# Patient Record
Sex: FEMALE | Race: WHITE | NOT HISPANIC OR LATINO | Employment: FULL TIME | URBAN - METROPOLITAN AREA
[De-identification: names, ages, dates, MRNs, and addresses within clinical notes are randomized per-mention and may not be internally consistent; named-entity substitution may affect disease eponyms.]

---

## 2023-03-23 DIAGNOSIS — R05.3 CHRONIC COUGH: Primary | ICD-10-CM

## 2023-05-08 DIAGNOSIS — I10 ESSENTIAL (PRIMARY) HYPERTENSION: ICD-10-CM

## 2023-05-08 RX ORDER — NEOMYCIN/POLYMYXIN B/PRAMOXINE 3.5-10K-1
2 CREAM (GRAM) TOPICAL DAILY
COMMUNITY

## 2023-05-08 RX ORDER — LOSARTAN POTASSIUM 100 MG/1
1 TABLET ORAL DAILY
COMMUNITY
Start: 2022-11-29 | End: 2023-06-26

## 2023-05-08 RX ORDER — CITALOPRAM 20 MG/1
1 TABLET, FILM COATED ORAL DAILY
COMMUNITY
Start: 2014-08-19 | End: 2023-07-24

## 2023-05-08 RX ORDER — SIMVASTATIN 20 MG/1
1 TABLET, FILM COATED ORAL DAILY
COMMUNITY
Start: 2019-02-19 | End: 2023-07-03

## 2023-05-08 RX ORDER — METOPROLOL SUCCINATE 100 MG/1
TABLET, EXTENDED RELEASE ORAL
Qty: 90 TABLET | Refills: 1 | Status: SHIPPED | OUTPATIENT
Start: 2023-05-08 | End: 2023-07-31 | Stop reason: SDUPTHER

## 2023-05-08 RX ORDER — METOPROLOL SUCCINATE 100 MG/1
1 TABLET, EXTENDED RELEASE ORAL DAILY
COMMUNITY
Start: 2019-10-04 | End: 2023-07-31 | Stop reason: ALTCHOICE

## 2023-05-08 RX ORDER — LEVONORGESTREL / ETHINYL ESTRADIOL AND ETHINYL ESTRADIOL 150-30(84)
1 KIT ORAL DAILY
COMMUNITY
Start: 2017-11-14 | End: 2023-10-19 | Stop reason: SDUPTHER

## 2023-05-08 RX ORDER — ALBUTEROL SULFATE 90 UG/1
2 AEROSOL, METERED RESPIRATORY (INHALATION)
COMMUNITY
Start: 2023-03-03

## 2023-05-15 LAB
BASOPHILS (10*3/UL) IN BLOOD BY AUTOMATED COUNT: 0.03 X10E9/L (ref 0–0.1)
BASOPHILS/100 LEUKOCYTES IN BLOOD BY AUTOMATED COUNT: 0.6 % (ref 0–2)
CALCIDIOL (25 OH VITAMIN D3) (NG/ML) IN SER/PLAS: 38 NG/ML
EOSINOPHILS (10*3/UL) IN BLOOD BY AUTOMATED COUNT: 0.07 X10E9/L (ref 0–0.7)
EOSINOPHILS/100 LEUKOCYTES IN BLOOD BY AUTOMATED COUNT: 1.5 % (ref 0–6)
ERYTHROCYTE DISTRIBUTION WIDTH (RATIO) BY AUTOMATED COUNT: 13.1 % (ref 11.5–14.5)
ERYTHROCYTE MEAN CORPUSCULAR HEMOGLOBIN CONCENTRATION (G/DL) BY AUTOMATED: 29.8 G/DL (ref 32–36)
ERYTHROCYTE MEAN CORPUSCULAR VOLUME (FL) BY AUTOMATED COUNT: 99 FL (ref 80–100)
ERYTHROCYTES (10*6/UL) IN BLOOD BY AUTOMATED COUNT: 4.5 X10E12/L (ref 4–5.2)
FERRITIN (UG/LL) IN SER/PLAS: 42 UG/L (ref 8–150)
HEMATOCRIT (%) IN BLOOD BY AUTOMATED COUNT: 44.6 % (ref 36–46)
HEMOGLOBIN (G/DL) IN BLOOD: 13.3 G/DL (ref 12–16)
IMMATURE GRANULOCYTES/100 LEUKOCYTES IN BLOOD BY AUTOMATED COUNT: 0 % (ref 0–0.9)
IRON (UG/DL) IN SER/PLAS: 136 UG/DL (ref 35–150)
IRON BINDING CAPACITY (UG/DL) IN SER/PLAS: 479 UG/DL (ref 240–445)
IRON SATURATION (%) IN SER/PLAS: 28 % (ref 25–45)
LEUKOCYTES (10*3/UL) IN BLOOD BY AUTOMATED COUNT: 4.7 X10E9/L (ref 4.4–11.3)
LYMPHOCYTES (10*3/UL) IN BLOOD BY AUTOMATED COUNT: 1.16 X10E9/L (ref 1.2–4.8)
LYMPHOCYTES/100 LEUKOCYTES IN BLOOD BY AUTOMATED COUNT: 24.8 % (ref 13–44)
MONOCYTES (10*3/UL) IN BLOOD BY AUTOMATED COUNT: 0.53 X10E9/L (ref 0.1–1)
MONOCYTES/100 LEUKOCYTES IN BLOOD BY AUTOMATED COUNT: 11.3 % (ref 2–10)
NEUTROPHILS (10*3/UL) IN BLOOD BY AUTOMATED COUNT: 2.88 X10E9/L (ref 1.2–7.7)
NEUTROPHILS/100 LEUKOCYTES IN BLOOD BY AUTOMATED COUNT: 61.8 % (ref 40–80)
NRBC (PER 100 WBCS) BY AUTOMATED COUNT: 0 /100 WBC (ref 0–0)
PLATELETS (10*3/UL) IN BLOOD AUTOMATED COUNT: 301 X10E9/L (ref 150–450)

## 2023-06-25 DIAGNOSIS — I10 ESSENTIAL (PRIMARY) HYPERTENSION: ICD-10-CM

## 2023-06-26 RX ORDER — LOSARTAN POTASSIUM 100 MG/1
TABLET ORAL
Qty: 90 TABLET | Refills: 0 | Status: SHIPPED | OUTPATIENT
Start: 2023-06-26 | End: 2023-07-31 | Stop reason: SDUPTHER

## 2023-07-03 DIAGNOSIS — I10 HYPERTENSION, UNSPECIFIED TYPE: ICD-10-CM

## 2023-07-03 RX ORDER — SIMVASTATIN 20 MG/1
TABLET, FILM COATED ORAL
Qty: 30 TABLET | Refills: 0 | Status: SHIPPED | OUTPATIENT
Start: 2023-07-03 | End: 2023-08-02

## 2023-07-22 DIAGNOSIS — F41.9 ANXIETY: ICD-10-CM

## 2023-07-24 PROBLEM — E78.00 HYPERCHOLESTEROLEMIA: Status: ACTIVE | Noted: 2023-07-24

## 2023-07-24 PROBLEM — J98.6 DIAPHRAGMATIC PARALYSIS: Status: ACTIVE | Noted: 2023-07-24

## 2023-07-24 PROBLEM — E04.9 GOITER, NODULAR: Status: ACTIVE | Noted: 2023-07-24

## 2023-07-24 PROBLEM — F41.9 ANXIETY: Status: ACTIVE | Noted: 2023-07-24

## 2023-07-24 PROBLEM — I10 BENIGN HYPERTENSION: Status: ACTIVE | Noted: 2023-07-24

## 2023-07-24 PROBLEM — R29.818 SUSPECTED SLEEP APNEA: Status: ACTIVE | Noted: 2023-07-24

## 2023-07-24 RX ORDER — CITALOPRAM 20 MG/1
TABLET, FILM COATED ORAL
Qty: 30 TABLET | Refills: 0 | Status: SHIPPED | OUTPATIENT
Start: 2023-07-24 | End: 2023-08-16

## 2023-07-24 NOTE — PROGRESS NOTES
Subjective   Patient ID:   Natty Lord is a 42 y.o. female who presents for Med Refill, Weight Loss, Sleep Study (Need cpap, 2 weeks ago had an appt ), and Follow-up (  Right Diaphragm paralyzed and elevated following up ).  HPI  Disorder of diaphragm:  Seeing pulmonology.    Sleep apnea:  Seeing sleep medicine.    Obesity:  Last A1c was 4.5 in 2019.  Continuing to gain weight.    HTN:  Taking Metoprolol and Losartan.  BP today is 165/107.  This is much higher than last visit.  Denies dizziness, headaches.     HLD:  Taking Simvastatin.  Last checked Nov 2022.     Depression/anxiety:  Taking Celexa.  Denies SI/HI.     Multinodular goiter:  Last checked April 2022 and was stable.  We did also find either a lymph node or benign parathyroid tumor.  She was to follow up with endocrine.     Health maintenance:  Smoking: Former smoker.  Mammogram: DUE - reports gets every October.  Labs: Nov 2022  Colon cancer family history: Sep 2021.  Influenza:     Review of Systems  12 point review of systems negative unless stated above in HPI    Vitals:    07/31/23 0850   BP: (!) 165/107   Pulse: 78   Resp: 18   SpO2: 97%       Physical Exam  General: Alert and oriented, well nourished, no acute distress.  Lungs: Clear to auscultation, non-labored respiration.  Heart: Normal rate, regular rhythm, no murmur, gallop or edema.  Neurologic: Awake, alert, and oriented X3, CN II-XII intact.  Psychiatric: Cooperative, appropriate mood and affect.    Assessment/Plan   I have ordered some labs to be done as soon as you can.  We will call you with the results.  Let's also increase your Losartan to 100mg twice daily.  Consider checking this at home.  Please call your insurance regarding weight loss medicines.  If symptoms persist or worsen despite current plan of care, please contact your healthcare provider for further evaluation.  Patient instructed to contact the office if there are any questions regarding their care or treatment.     [facility] [phone].  [facility] [phone].    Fu 1 month  Diagnoses and all orders for this visit:  Anxiety  Benign hypertension  Diaphragmatic paralysis  Goiter, nodular  Hypercholesterolemia  Suspected sleep apnea  Essential (primary) hypertension  -     losartan (Cozaar) 100 mg tablet; Take 1 tablet (100 mg) by mouth 2 times a day.  -     metoprolol succinate XL (Toprol-XL) 100 mg 24 hr tablet; Take 1 tablet (100 mg) by mouth once daily. Do not crush or chew.  BMI 50.0-59.9, adult (CMS/[facility])

## 2023-07-31 ENCOUNTER — LAB (OUTPATIENT)
Dept: LAB | Facility: LAB | Age: 43
End: 2023-07-31
Payer: COMMERCIAL

## 2023-07-31 ENCOUNTER — OFFICE VISIT (OUTPATIENT)
Dept: PRIMARY CARE | Facility: CLINIC | Age: 43
End: 2023-07-31
Payer: COMMERCIAL

## 2023-07-31 VITALS
HEART RATE: 78 BPM | OXYGEN SATURATION: 97 % | WEIGHT: 293 LBS | HEIGHT: 67 IN | RESPIRATION RATE: 18 BRPM | BODY MASS INDEX: 45.99 KG/M2 | SYSTOLIC BLOOD PRESSURE: 165 MMHG | DIASTOLIC BLOOD PRESSURE: 107 MMHG

## 2023-07-31 DIAGNOSIS — E78.00 HYPERCHOLESTEROLEMIA: ICD-10-CM

## 2023-07-31 DIAGNOSIS — I10 BENIGN HYPERTENSION: ICD-10-CM

## 2023-07-31 DIAGNOSIS — R73.9 HYPERGLYCEMIA: ICD-10-CM

## 2023-07-31 DIAGNOSIS — J98.6 DIAPHRAGMATIC PARALYSIS: ICD-10-CM

## 2023-07-31 DIAGNOSIS — E04.9 GOITER, NODULAR: ICD-10-CM

## 2023-07-31 DIAGNOSIS — I10 ESSENTIAL (PRIMARY) HYPERTENSION: ICD-10-CM

## 2023-07-31 DIAGNOSIS — R29.818 SUSPECTED SLEEP APNEA: ICD-10-CM

## 2023-07-31 DIAGNOSIS — F41.9 ANXIETY: Primary | ICD-10-CM

## 2023-07-31 PROBLEM — K52.9 ACUTE GASTROENTERITIS: Status: RESOLVED | Noted: 2023-07-31 | Resolved: 2023-07-31

## 2023-07-31 PROBLEM — R05.9 COUGH: Status: RESOLVED | Noted: 2023-07-31 | Resolved: 2023-07-31

## 2023-07-31 PROBLEM — J20.8 ACUTE VIRAL BRONCHITIS: Status: RESOLVED | Noted: 2023-07-31 | Resolved: 2023-07-31

## 2023-07-31 PROBLEM — E66.01 MORBID OBESITY (MULTI): Status: ACTIVE | Noted: 2023-07-31

## 2023-07-31 PROBLEM — E55.9 VITAMIN D DEFICIENCY: Status: ACTIVE | Noted: 2023-07-31

## 2023-07-31 PROBLEM — J20.9 ACUTE BRONCHITIS: Status: RESOLVED | Noted: 2023-07-31 | Resolved: 2023-07-31

## 2023-07-31 PROBLEM — D72.9 ABNORMAL WBC COUNT: Status: ACTIVE | Noted: 2023-07-31

## 2023-07-31 PROBLEM — M79.673 FOOT PAIN: Status: ACTIVE | Noted: 2023-07-31

## 2023-07-31 PROBLEM — F32.A DEPRESSION: Status: ACTIVE | Noted: 2023-07-31

## 2023-07-31 PROBLEM — M06.9 RHEUMATOID ARTHRITIS (MULTI): Status: ACTIVE | Noted: 2023-07-31

## 2023-07-31 PROBLEM — R05.3 CHRONIC COUGH: Status: RESOLVED | Noted: 2023-07-31 | Resolved: 2023-07-31

## 2023-07-31 PROBLEM — G47.33 OBSTRUCTIVE SLEEP APNEA: Status: ACTIVE | Noted: 2023-07-31

## 2023-07-31 PROBLEM — E88.819 INSULIN RESISTANCE: Status: ACTIVE | Noted: 2023-07-31

## 2023-07-31 PROBLEM — E04.1 THYROID NODULE: Status: ACTIVE | Noted: 2023-07-31

## 2023-07-31 PROBLEM — K63.5 POLYP OF COLON: Status: ACTIVE | Noted: 2023-07-31

## 2023-07-31 PROBLEM — D72.819 LEUKOPENIA: Status: ACTIVE | Noted: 2023-07-31

## 2023-07-31 PROBLEM — F45.41 STRESS HEADACHE: Status: ACTIVE | Noted: 2023-07-31

## 2023-07-31 PROBLEM — E86.0 MILD DEHYDRATION: Status: RESOLVED | Noted: 2023-07-31 | Resolved: 2023-07-31

## 2023-07-31 PROBLEM — J98.4 SCARRING OF LUNG: Status: ACTIVE | Noted: 2023-07-31

## 2023-07-31 PROBLEM — M77.11 LATERAL EPICONDYLITIS OF RIGHT ELBOW: Status: ACTIVE | Noted: 2023-07-31

## 2023-07-31 PROBLEM — J10.1 INFLUENZA DUE TO INFLUENZA A VIRUS: Status: RESOLVED | Noted: 2023-07-31 | Resolved: 2023-07-31

## 2023-07-31 PROBLEM — G47.19 EXCESSIVE DAYTIME SLEEPINESS: Status: ACTIVE | Noted: 2023-07-31

## 2023-07-31 PROBLEM — J03.90: Status: RESOLVED | Noted: 2023-07-31 | Resolved: 2023-07-31

## 2023-07-31 PROBLEM — Z86.39 H/O HYPERCHOLESTEROLEMIA: Status: ACTIVE | Noted: 2023-07-31

## 2023-07-31 PROBLEM — R06.09 DOE (DYSPNEA ON EXERTION): Status: ACTIVE | Noted: 2023-07-31

## 2023-07-31 PROBLEM — E83.10 DISORDER OF IRON METABOLISM: Status: ACTIVE | Noted: 2023-07-31

## 2023-07-31 PROBLEM — G47.00 INSOMNIA: Status: ACTIVE | Noted: 2023-07-31

## 2023-07-31 PROBLEM — J02.9 ACUTE PHARYNGITIS: Status: RESOLVED | Noted: 2023-07-31 | Resolved: 2023-07-31

## 2023-07-31 LAB — THYROTROPIN (MIU/L) IN SER/PLAS BY DETECTION LIMIT <= 0.05 MIU/L: 2.14 MIU/L (ref 0.44–3.98)

## 2023-07-31 PROCEDURE — 3077F SYST BP >= 140 MM HG: CPT | Performed by: PHYSICIAN ASSISTANT

## 2023-07-31 PROCEDURE — 99214 OFFICE O/P EST MOD 30 MIN: CPT | Performed by: PHYSICIAN ASSISTANT

## 2023-07-31 PROCEDURE — 36415 COLL VENOUS BLD VENIPUNCTURE: CPT

## 2023-07-31 PROCEDURE — 83036 HEMOGLOBIN GLYCOSYLATED A1C: CPT

## 2023-07-31 PROCEDURE — 1036F TOBACCO NON-USER: CPT | Performed by: PHYSICIAN ASSISTANT

## 2023-07-31 PROCEDURE — 3080F DIAST BP >= 90 MM HG: CPT | Performed by: PHYSICIAN ASSISTANT

## 2023-07-31 PROCEDURE — 3008F BODY MASS INDEX DOCD: CPT | Performed by: PHYSICIAN ASSISTANT

## 2023-07-31 PROCEDURE — 84443 ASSAY THYROID STIM HORMONE: CPT

## 2023-07-31 RX ORDER — METOPROLOL SUCCINATE 100 MG/1
100 TABLET, EXTENDED RELEASE ORAL DAILY
Qty: 90 TABLET | Refills: 1 | Status: SHIPPED | OUTPATIENT
Start: 2023-07-31 | End: 2024-01-29

## 2023-07-31 RX ORDER — LOSARTAN POTASSIUM 100 MG/1
100 TABLET ORAL 2 TIMES DAILY
Qty: 180 TABLET | Refills: 1 | Status: SHIPPED | OUTPATIENT
Start: 2023-07-31 | End: 2024-01-25

## 2023-07-31 RX ORDER — LISINOPRIL 20 MG/1
1 TABLET ORAL DAILY
COMMUNITY
Start: 2022-10-05 | End: 2023-07-31 | Stop reason: ALTCHOICE

## 2023-07-31 ASSESSMENT — PAIN SCALES - GENERAL: PAINLEVEL: 0-NO PAIN

## 2023-08-01 LAB
ESTIMATED AVERAGE GLUCOSE FOR HBA1C: 105 MG/DL
HEMOGLOBIN A1C/HEMOGLOBIN TOTAL IN BLOOD: 5.3 %

## 2023-08-02 DIAGNOSIS — I10 HYPERTENSION, UNSPECIFIED TYPE: ICD-10-CM

## 2023-08-02 RX ORDER — SIMVASTATIN 20 MG/1
20 TABLET, FILM COATED ORAL DAILY
Qty: 90 TABLET | Refills: 0 | Status: SHIPPED | OUTPATIENT
Start: 2023-08-02 | End: 2023-10-30

## 2023-08-04 RX ORDER — TIRZEPATIDE 2.5 MG/.5ML
2.5 INJECTION, SOLUTION SUBCUTANEOUS
Qty: 2 ML | Refills: 1 | Status: SHIPPED | OUTPATIENT
Start: 2023-08-04 | End: 2023-08-31

## 2023-08-16 DIAGNOSIS — F41.9 ANXIETY: ICD-10-CM

## 2023-08-16 RX ORDER — CITALOPRAM 20 MG/1
TABLET, FILM COATED ORAL
Qty: 30 TABLET | Refills: 0 | Status: SHIPPED | OUTPATIENT
Start: 2023-08-16 | End: 2023-09-12

## 2023-08-22 ENCOUNTER — PATIENT MESSAGE (OUTPATIENT)
Dept: PRIMARY CARE | Facility: CLINIC | Age: 43
End: 2023-08-22
Payer: COMMERCIAL

## 2023-08-23 NOTE — PROGRESS NOTES
Subjective   Patient ID:   Natty Lord is a 42 y.o. female who presents for Follow-up (F/u bp).  HPI  Disorder of diaphragm:  Seeing pulmonology.    Sleep apnea:  Seeing sleep medicine.    Obesity:  Last A1c was 5.3 in July 2023.  Continuing to gain weight.  I advised to call insurance about weight loss medications last visit.    HTN:  I increased Losartan last visit.  Taking Metoprolol and Losartan.  BP today is 136/85.  Denies dizziness, headaches.     HLD:  Taking Simvastatin.  Last checked Nov 2022.     Depression/anxiety:  Taking Celexa.  Denies SI/HI.     Multinodular goiter:  Last checked April 2022 and was stable.  We did also find either a lymph node or benign parathyroid tumor.  She was to follow up with endocrine.  TSH was normal in July 2023.     Health maintenance:  Smoking: Former smoker.  Mammogram: DUE - reports gets every October.  Labs: Nov 2022  Colon cancer family history: Sep 2021.  Influenza:     Review of Systems  12 point review of systems negative unless stated above in HPI    Vitals:    08/31/23 0757   BP: 136/85   Pulse: 76   Resp: 18   SpO2: 95%     Physical Exam  General: Alert and oriented, well nourished, no acute distress.  Lungs: Clear to auscultation, non-labored respiration.  Heart: Normal rate, regular rhythm, no murmur, gallop or edema.  Neurologic: Awake, alert, and oriented X3, CN II-XII intact.  Psychiatric: Cooperative, appropriate mood and affect.    Assessment/Plan   I am glad you are well!  BP is much better today.  I have ordered you a mammogram to be done as soon as you are able.  We will call the results.  Continue the same medications.  Chronic conditions are stable.  Call with questions or concerns.    Follow up  3-4 months  Diagnoses and all orders for this visit:  Anxiety  Benign hypertension  Diaphragmatic paralysis  Goiter, nodular  Hypercholesterolemia  Essential (primary) hypertension  BMI 50.0-59.9, adult (CMS/[facility])  Visit for screening mammogram  -     BI  mammo bilateral screening tomosynthesis; Future

## 2023-08-31 ENCOUNTER — OFFICE VISIT (OUTPATIENT)
Dept: PRIMARY CARE | Facility: CLINIC | Age: 43
End: 2023-08-31
Payer: COMMERCIAL

## 2023-08-31 VITALS
BODY MASS INDEX: 45.99 KG/M2 | SYSTOLIC BLOOD PRESSURE: 136 MMHG | DIASTOLIC BLOOD PRESSURE: 85 MMHG | OXYGEN SATURATION: 95 % | HEART RATE: 76 BPM | HEIGHT: 67 IN | RESPIRATION RATE: 18 BRPM | WEIGHT: 293 LBS

## 2023-08-31 DIAGNOSIS — I10 BENIGN HYPERTENSION: ICD-10-CM

## 2023-08-31 DIAGNOSIS — Z12.31 VISIT FOR SCREENING MAMMOGRAM: ICD-10-CM

## 2023-08-31 DIAGNOSIS — F41.9 ANXIETY: Primary | ICD-10-CM

## 2023-08-31 DIAGNOSIS — E04.9 GOITER, NODULAR: ICD-10-CM

## 2023-08-31 DIAGNOSIS — I10 ESSENTIAL (PRIMARY) HYPERTENSION: ICD-10-CM

## 2023-08-31 DIAGNOSIS — J98.6 DIAPHRAGMATIC PARALYSIS: ICD-10-CM

## 2023-08-31 DIAGNOSIS — E78.00 HYPERCHOLESTEROLEMIA: ICD-10-CM

## 2023-08-31 PROBLEM — R87.810 CERVICAL HIGH RISK HPV (HUMAN PAPILLOMAVIRUS) TEST POSITIVE: Status: ACTIVE | Noted: 2023-08-31

## 2023-08-31 PROCEDURE — 3008F BODY MASS INDEX DOCD: CPT | Performed by: PHYSICIAN ASSISTANT

## 2023-08-31 PROCEDURE — 99213 OFFICE O/P EST LOW 20 MIN: CPT | Performed by: PHYSICIAN ASSISTANT

## 2023-08-31 PROCEDURE — 1036F TOBACCO NON-USER: CPT | Performed by: PHYSICIAN ASSISTANT

## 2023-08-31 PROCEDURE — 3075F SYST BP GE 130 - 139MM HG: CPT | Performed by: PHYSICIAN ASSISTANT

## 2023-08-31 PROCEDURE — 3079F DIAST BP 80-89 MM HG: CPT | Performed by: PHYSICIAN ASSISTANT

## 2023-08-31 ASSESSMENT — PAIN SCALES - GENERAL: PAINLEVEL: 0-NO PAIN

## 2023-09-12 DIAGNOSIS — F41.9 ANXIETY: ICD-10-CM

## 2023-09-12 RX ORDER — CITALOPRAM 20 MG/1
TABLET, FILM COATED ORAL
Qty: 30 TABLET | Refills: 0 | Status: SHIPPED | OUTPATIENT
Start: 2023-09-12 | End: 2023-10-16

## 2023-10-16 DIAGNOSIS — F41.9 ANXIETY: ICD-10-CM

## 2023-10-16 RX ORDER — CITALOPRAM 20 MG/1
TABLET, FILM COATED ORAL
Qty: 90 TABLET | Refills: 0 | Status: SHIPPED | OUTPATIENT
Start: 2023-10-16 | End: 2024-01-17

## 2023-10-17 ENCOUNTER — TELEPHONE (OUTPATIENT)
Dept: OBSTETRICS AND GYNECOLOGY | Facility: CLINIC | Age: 43
End: 2023-10-17
Payer: COMMERCIAL

## 2023-10-17 NOTE — TELEPHONE ENCOUNTER
Patient scheduled annual in November, requesting refill for her camrese 0.5mg-30 mcg to be sent to her local pharmacy on file

## 2023-10-19 DIAGNOSIS — Z30.41 SURVEILLANCE FOR BIRTH CONTROL, ORAL CONTRACEPTIVES: Primary | ICD-10-CM

## 2023-10-19 RX ORDER — LEVONORGESTREL / ETHINYL ESTRADIOL AND ETHINYL ESTRADIOL 150-30(84)
1 KIT ORAL DAILY
Qty: 91 TABLET | Refills: 0 | Status: SHIPPED | OUTPATIENT
Start: 2023-10-19 | End: 2023-11-27 | Stop reason: SDUPTHER

## 2023-10-25 ENCOUNTER — ANCILLARY PROCEDURE (OUTPATIENT)
Dept: RADIOLOGY | Facility: CLINIC | Age: 43
End: 2023-10-25
Payer: COMMERCIAL

## 2023-10-25 VITALS — WEIGHT: 293 LBS | BODY MASS INDEX: 44.41 KG/M2 | HEIGHT: 68 IN

## 2023-10-25 DIAGNOSIS — Z12.31 VISIT FOR SCREENING MAMMOGRAM: ICD-10-CM

## 2023-10-25 PROCEDURE — 77067 SCR MAMMO BI INCL CAD: CPT | Mod: 50

## 2023-10-30 DIAGNOSIS — I10 HYPERTENSION, UNSPECIFIED TYPE: ICD-10-CM

## 2023-10-30 RX ORDER — SIMVASTATIN 20 MG/1
20 TABLET, FILM COATED ORAL DAILY
Qty: 90 TABLET | Refills: 0 | Status: SHIPPED | OUTPATIENT
Start: 2023-10-30 | End: 2024-01-25

## 2023-10-30 NOTE — PROGRESS NOTES
[facility]  Followup Visit Note    HISTORY OF PRESENT ILLNESS   Natty Lord is a 43 y.o. female who presents to a [facility] for followup.     The patient  has a past medical history of Acute bronchitis (07/31/2023), Acute gastroenteritis (07/31/2023), Acute pharyngitis (07/31/2023), Acute suppurative tonsillitis (07/31/2023), Acute viral bronchitis (07/31/2023), Cough (07/31/2023), Mild dehydration (07/31/2023), Other specified anxiety disorders, Personal history of diseases of the blood and blood-forming organs and certain disorders involving the immune mechanism, Personal history of other specified conditions (12/12/2013), and Personal history of other specified conditions..      Current History    On today's visit, the patient reports she feels better with CPAP. She kept waking up with FF masks. She had leaking with that. Now using just the nasal mask and doing better.    Headaches are are much better on CPAP. She is waking up less with CPAP. She hasn't noticed a large difference in wakefulness.    RLS symptoms remain minimally bothersome.     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 (see scanned document in EPIC)  % days >4 hours use: 46  Average use : 5 hr 25 min  95% pressure 9.5 cm H2O  95% leak : 23.1 L/min  AHI: 1.8    Sleep Scales:  ESS: 8     REVIEW OF SYSTEMS    All other systems negative      ALLERGIES AND  "MEDICATIONS     ALLERGIES  Allergies   Allergen Reactions    Azithromycin Hives    Bupropion Unknown    Bupropion Hcl Unknown       MEDICATIONS: She has a current medication list which includes the following prescription(s): albuterol - Inhale 2 puffs, camrese - Take 1 tablet by mouth once daily, citalopram - TAKE ONE TABLET BY MOUTH ONCE DAILY ** NEEDS TO CONTACT OFFICE FOR APPT FOR FURTHER REFILLS, losartan - Take 1 tablet (100 mg) by mouth 2 times a day, metoprolol succinate xl - Take 1 tablet (100 mg) by mouth once daily. Do not crush or chew, mv-min-folic acid-lutein - Chew 2 tablets once daily, and simvastatin - TAKE 1 TABLET BY MOUTH EVERY DAY.    PAST MEDICAL HISTORY : She  has a past medical history of Acute bronchitis (2023), Acute gastroenteritis (2023), Acute pharyngitis (2023), Acute suppurative tonsillitis (2023), Acute viral bronchitis (2023), Cough (2023), Mild dehydration (2023), Other specified anxiety disorders, Personal history of diseases of the blood and blood-forming organs and certain disorders involving the immune mechanism, Personal history of other specified conditions (2013), and Personal history of other specified conditions.    PAST SURGICAL HISTORY: She  has a past surgical history that includes  section, classic (2012).     FAMILY HISTORY: No changes since previous visit. Otherwise non-contributory as charted.       SOCIAL HISTORY  She  reports that she quit smoking about 15 years ago. Her smoking use included cigarettes. She started smoking about 24 years ago. She smoked an average of .25 packs per day. She has never used smokeless tobacco. She reports that she does not currently use drugs. She reports that she does not drink alcohol.       PHYSICAL EXAM     VITAL SIGNS: /86   Pulse 73   Ht 1.727 m (5' 8\")   Wt 145 kg (320 lb)   LMP 2023 Comment: birth contol cycle every 3 months  SpO2 94%   BMI 48.66 " kg/m²      CURRENT WEIGHT: [unfilled]  BMI: [unfilled]  PREVIOUS WEIGHTS:  Wt Readings from Last 3 Encounters:   11/01/23 145 kg (320 lb)   10/25/23 147 kg (325 lb)   08/31/23 145 kg (320 lb 9.6 oz)       Constitutional: Alert and oriented, cooperative, no obvious distress   HEENT: Non icteric or anemic, EOM WNL bilaterally   Neck: Supple, no JVD, no goiter, no adenopathy, no rigidity  Extremities: No clubbing, no LL edema   Neuromuscular: Cranial nerves grossly intact, no focal deficits     RESULTS/DATA     Iron (ug/dL)   Date Value   05/15/2023 136     Iron Saturation (%)   Date Value   05/15/2023 28     TIBC (ug/dL)   Date Value   05/15/2023 479 (H)     Ferritin (ug/L)   Date Value   05/15/2023 42         ASSESSMENT/PLAN     Ms. Lord is a 43 y.o. female and returns in followup for the following issues:    OBSTRUCTIVE SLEEP APNEA / HEADACHES  -Benefiting from CPAP - fewer headaches and less waking at night  -Very close to meeting 70% compliance requirement  -Goal of CPAP therapy is less sleepiness, less waking, fewer headaches, better BP    OBESITY  -BMI 49 TODAY    HYPERTENSION  -/86 today    RESTLESS LEG SYNDROME  -Symptoms remain minimally bothersome    Follow up in 3 weeks for compliance requirement

## 2023-11-01 ENCOUNTER — OFFICE VISIT (OUTPATIENT)
Dept: SLEEP MEDICINE | Facility: CLINIC | Age: 43
End: 2023-11-01
Payer: COMMERCIAL

## 2023-11-01 VITALS
BODY MASS INDEX: 44.41 KG/M2 | OXYGEN SATURATION: 94 % | DIASTOLIC BLOOD PRESSURE: 86 MMHG | HEIGHT: 68 IN | HEART RATE: 73 BPM | SYSTOLIC BLOOD PRESSURE: 139 MMHG | WEIGHT: 293 LBS

## 2023-11-01 DIAGNOSIS — I10 ESSENTIAL (PRIMARY) HYPERTENSION: ICD-10-CM

## 2023-11-01 DIAGNOSIS — G47.33 OSA (OBSTRUCTIVE SLEEP APNEA): Primary | ICD-10-CM

## 2023-11-01 DIAGNOSIS — G44.229 CHRONIC TENSION-TYPE HEADACHE, NOT INTRACTABLE: ICD-10-CM

## 2023-11-01 DIAGNOSIS — G25.81 RESTLESS LEG SYNDROME: ICD-10-CM

## 2023-11-01 PROCEDURE — 3079F DIAST BP 80-89 MM HG: CPT | Performed by: PHYSICIAN ASSISTANT

## 2023-11-01 PROCEDURE — 99214 OFFICE O/P EST MOD 30 MIN: CPT | Performed by: PHYSICIAN ASSISTANT

## 2023-11-01 PROCEDURE — 3008F BODY MASS INDEX DOCD: CPT | Performed by: PHYSICIAN ASSISTANT

## 2023-11-01 PROCEDURE — 3075F SYST BP GE 130 - 139MM HG: CPT | Performed by: PHYSICIAN ASSISTANT

## 2023-11-01 PROCEDURE — 1036F TOBACCO NON-USER: CPT | Performed by: PHYSICIAN ASSISTANT

## 2023-11-01 ASSESSMENT — SLEEP AND FATIGUE QUESTIONNAIRES
HOW LIKELY ARE YOU TO NOD OFF OR FALL ASLEEP WHILE LYING DOWN TO REST IN THE AFTERNOON WHEN CIRCUMSTANCES PERMIT: WOULD NEVER DOZE
SITING INACTIVE IN A PUBLIC PLACE LIKE A CLASS ROOM OR A MOVIE THEATER: MODERATE CHANCE OF DOZING
HOW LIKELY ARE YOU TO NOD OFF OR FALL ASLEEP WHILE SITTING QUIETLY AFTER LUNCH WITHOUT ALCOHOL: WOULD NEVER DOZE
HOW LIKELY ARE YOU TO NOD OFF OR FALL ASLEEP IN A CAR, WHILE STOPPED FOR A FEW MINUTES IN TRAFFIC: WOULD NEVER DOZE
ESS-CHAD TOTAL SCORE: 8
HOW LIKELY ARE YOU TO NOD OFF OR FALL ASLEEP WHILE SITTING AND READING: SLIGHT CHANCE OF DOZING
HOW LIKELY ARE YOU TO NOD OFF OR FALL ASLEEP WHILE WATCHING TV: MODERATE CHANCE OF DOZING
HOW LIKELY ARE YOU TO NOD OFF OR FALL ASLEEP WHILE SITTING AND TALKING TO SOMEONE: WOULD NEVER DOZE
HOW LIKELY ARE YOU TO NOD OFF OR FALL ASLEEP WHEN YOU ARE A PASSENGER IN A CAR FOR AN HOUR WITHOUT A BREAK: HIGH CHANCE OF DOZING

## 2023-11-01 ASSESSMENT — PAIN SCALES - GENERAL: PAINLEVEL: 0-NO PAIN

## 2023-11-01 ASSESSMENT — LIFESTYLE VARIABLES: HOW MANY STANDARD DRINKS CONTAINING ALCOHOL DO YOU HAVE ON A TYPICAL DAY: PATIENT DOES NOT DRINK

## 2023-11-01 ASSESSMENT — PATIENT HEALTH QUESTIONNAIRE - PHQ9
2. FEELING DOWN, DEPRESSED OR HOPELESS: NOT AT ALL
SUM OF ALL RESPONSES TO PHQ9 QUESTIONS 1 & 2: 0
1. LITTLE INTEREST OR PLEASURE IN DOING THINGS: NOT AT ALL

## 2023-11-01 NOTE — PATIENT INSTRUCTIONS
"  Thank you for coming to the Sleep Medicine Clinic today! Your sleep medicine provider today was: Domenico Ospina PA-C Below is a summary of your treatment plan, other important information, and our contact numbers:      TREATMENT PLAN     Keep using CPAP as much as possible!    Follow-up Appointment:   3 weeks      IMPORTANT PHONE NUMBERS     Sleep Medicine Clinic Fax: [phone]  Appointments (for Adult Sleep Clinic): [phone] - option 2  Appointments (For Sleep Studies): [phone] - option 3  Behavioral Sleep Medicine: [phone]    [facility] ([employer]): [phone]  For clinical questions and refilling prescriptions: [phone]  Jennifer Crowley (For Pranav/Anai): P: [phone]  F: [phone]       CONTACTING YOUR SLEEP MEDICINE PROVIDER     Send a message directly to your provider through \"My Chart\", which is the email service through your  Records Account: [email or web address]   Call [phone] and leave a message. One of the administrative assistants will forward the message to your sleep medicine provider through \"My Chart\" and/or email.     Your sleep medicine provider for this visit was: Domenico Ospina PA-C       "

## 2023-11-08 ENCOUNTER — PATIENT MESSAGE (OUTPATIENT)
Dept: PULMONOLOGY | Facility: CLINIC | Age: 43
End: 2023-11-08
Payer: COMMERCIAL

## 2023-11-08 DIAGNOSIS — R09.82 POST-NASAL DRAINAGE: Primary | ICD-10-CM

## 2023-11-09 PROBLEM — R09.82 POST-NASAL DRAINAGE: Status: ACTIVE | Noted: 2023-11-09

## 2023-11-26 ASSESSMENT — ENCOUNTER SYMPTOMS
DYSURIA: 0
CHILLS: 0
NAUSEA: 0
COUGH: 0
DIZZINESS: 0
VOMITING: 0
UNEXPECTED WEIGHT CHANGE: 0
SHORTNESS OF BREATH: 0
FATIGUE: 0
FEVER: 0
HEADACHES: 0
ABDOMINAL PAIN: 0
COLOR CHANGE: 0

## 2023-11-27 ENCOUNTER — OFFICE VISIT (OUTPATIENT)
Dept: OBSTETRICS AND GYNECOLOGY | Facility: CLINIC | Age: 43
End: 2023-11-27
Payer: COMMERCIAL

## 2023-11-27 VITALS — SYSTOLIC BLOOD PRESSURE: 158 MMHG | WEIGHT: 293 LBS | BODY MASS INDEX: 48.93 KG/M2 | DIASTOLIC BLOOD PRESSURE: 88 MMHG

## 2023-11-27 DIAGNOSIS — Z30.41 SURVEILLANCE FOR BIRTH CONTROL, ORAL CONTRACEPTIVES: ICD-10-CM

## 2023-11-27 DIAGNOSIS — Z01.419 ENCOUNTER FOR ANNUAL ROUTINE GYNECOLOGICAL EXAMINATION: Primary | ICD-10-CM

## 2023-11-27 DIAGNOSIS — Z12.31 ENCOUNTER FOR SCREENING MAMMOGRAM FOR MALIGNANT NEOPLASM OF BREAST: ICD-10-CM

## 2023-11-27 DIAGNOSIS — N89.8 VAGINAL IRRITATION: ICD-10-CM

## 2023-11-27 PROCEDURE — 99396 PREV VISIT EST AGE 40-64: CPT

## 2023-11-27 PROCEDURE — 3008F BODY MASS INDEX DOCD: CPT

## 2023-11-27 PROCEDURE — 1036F TOBACCO NON-USER: CPT

## 2023-11-27 PROCEDURE — 3079F DIAST BP 80-89 MM HG: CPT

## 2023-11-27 PROCEDURE — 3077F SYST BP >= 140 MM HG: CPT

## 2023-11-27 RX ORDER — FLUCONAZOLE 150 MG/1
150 TABLET ORAL ONCE
Qty: 1 TABLET | Refills: 0 | Status: SHIPPED | OUTPATIENT
Start: 2023-11-27 | End: 2023-11-27

## 2023-11-27 RX ORDER — LEVONORGESTREL / ETHINYL ESTRADIOL AND ETHINYL ESTRADIOL 150-30(84)
1 KIT ORAL DAILY
Qty: 91 TABLET | Refills: 3 | Status: SHIPPED | OUTPATIENT
Start: 2023-11-27 | End: 2024-11-26

## 2023-11-27 ASSESSMENT — LIFESTYLE VARIABLES
HOW OFTEN DO YOU HAVE SIX OR MORE DRINKS ON ONE OCCASION: NEVER
HOW MANY STANDARD DRINKS CONTAINING ALCOHOL DO YOU HAVE ON A TYPICAL DAY: PATIENT DOES NOT DRINK
HOW OFTEN DO YOU HAVE A DRINK CONTAINING ALCOHOL: NEVER
AUDIT-C TOTAL SCORE: 0
SKIP TO QUESTIONS 9-10: 1

## 2023-11-27 ASSESSMENT — PATIENT HEALTH QUESTIONNAIRE - PHQ9
SUM OF ALL RESPONSES TO PHQ9 QUESTIONS 1 & 2: 0
2. FEELING DOWN, DEPRESSED OR HOPELESS: NOT AT ALL
1. LITTLE INTEREST OR PLEASURE IN DOING THINGS: NOT AT ALL

## 2023-11-27 ASSESSMENT — ENCOUNTER SYMPTOMS
CONSTITUTIONAL NEGATIVE: 0
EYES NEGATIVE: 0
CARDIOVASCULAR NEGATIVE: 0
MUSCULOSKELETAL NEGATIVE: 0
ENDOCRINE NEGATIVE: 0
RESPIRATORY NEGATIVE: 0
PSYCHIATRIC NEGATIVE: 0
HEMATOLOGIC/LYMPHATIC NEGATIVE: 0
NEUROLOGICAL NEGATIVE: 0
ALLERGIC/IMMUNOLOGIC NEGATIVE: 0
GASTROINTESTINAL NEGATIVE: 0

## 2023-11-27 NOTE — PROGRESS NOTES
"Subjective   Natty Lord is a 43 y.o. female who is here for a routine GYN exam. Last saw Dr. Golden 10/2022, previous Dr. Pike patient. Overall doing well. Tolerating OCP well - hx of HTN overall controlled with meds, but denies migraines with auras, smoking, liver dz, vasc dz or blood clots. Denies breast changes or concerns. Does have some vaginal irritation / \"yeast\" symptoms right now after finishing her withdrawal bleed. No abnormal discharge, mainly itching and burning.     Complaints: see HPI  Periods: regular, 91 day OCP  Dysmenorrhea: none    Current contraception: OCP  History of abnormal Pap smear: yes  History of abnormal mammogram: no      OB History          1    Para   1    Term   1            AB        Living             SAB        IAB        Ectopic        Multiple        Live Births                      Review of Systems   Constitutional:  Negative for chills, fatigue, fever and unexpected weight change.   Respiratory:  Negative for cough and shortness of breath.    Gastrointestinal:  Negative for abdominal pain, nausea and vomiting.   Genitourinary:  Negative for dyspareunia, dysuria, pelvic pain and vaginal discharge.        + vaginal irritation   Skin:  Negative for color change and rash.   Neurological:  Negative for dizziness and headaches.       Objective   /88   Wt 146 kg (321 lb 12.8 oz)   LMP 2023   BMI 48.93 kg/m²        General:   Alert and oriented, in no acute distress   Neck: Supple. No visible thyromegaly.    Breast/Axilla: Normal to palpation bilaterally without masses, skin changes, or nipple discharge.    Abdomen: Soft, non-tender, without masses or organomegaly   Vulva: Normal architecture without erythema, masses, or lesions.    Vagina: Normal mucosa without lesions, masses, or atrophy. Some erythema at introitus, small amount white discharge   Cervix: Normal without masses, lesions, or signs of cervicitis   Uterus: Grossly normal but " limited due to body habitus, non-tender   Adnexa: Grossly normal but limited due to body habitus, non-tender   Pelvic Floor Normal    Psych Normal affect. Normal mood.      Assessment/Plan   -UTD on pap smear, next due 11/2025.  -UTD on mammogram, 10/25/23 negative.  -Continue OCPs as tolerated, refills sent. Encouraged patient to check BP at home regularly and discuss with PCP if not well controlled with current meds; emphasized importance of stable BP while on EE containing OCP; patient aware.   -Diflucan x1 PO sent to patient's pharmacy for current yeast symptoms. She is aware to call the office if not improving with treatment.     Mitali Benitez PA-C

## 2023-11-29 ENCOUNTER — APPOINTMENT (OUTPATIENT)
Dept: SLEEP MEDICINE | Facility: CLINIC | Age: 43
End: 2023-11-29
Payer: COMMERCIAL

## 2024-01-17 DIAGNOSIS — F41.9 ANXIETY: ICD-10-CM

## 2024-01-17 RX ORDER — CITALOPRAM 20 MG/1
TABLET, FILM COATED ORAL
Qty: 90 TABLET | Refills: 0 | Status: SHIPPED | OUTPATIENT
Start: 2024-01-17 | End: 2024-04-16

## 2024-01-25 DIAGNOSIS — I10 ESSENTIAL (PRIMARY) HYPERTENSION: ICD-10-CM

## 2024-01-25 DIAGNOSIS — I10 HYPERTENSION, UNSPECIFIED TYPE: ICD-10-CM

## 2024-01-25 RX ORDER — LOSARTAN POTASSIUM 100 MG/1
100 TABLET ORAL DAILY
Qty: 60 TABLET | Refills: 0 | Status: SHIPPED | OUTPATIENT
Start: 2024-01-25 | End: 2024-02-13 | Stop reason: WASHOUT

## 2024-01-25 RX ORDER — SIMVASTATIN 20 MG/1
20 TABLET, FILM COATED ORAL DAILY
Qty: 30 TABLET | Refills: 0 | Status: SHIPPED | OUTPATIENT
Start: 2024-01-25 | End: 2024-02-19

## 2024-01-28 DIAGNOSIS — I10 ESSENTIAL (PRIMARY) HYPERTENSION: ICD-10-CM

## 2024-01-29 RX ORDER — METOPROLOL SUCCINATE 100 MG/1
100 TABLET, EXTENDED RELEASE ORAL DAILY
Qty: 90 TABLET | Refills: 1 | Status: SHIPPED | OUTPATIENT
Start: 2024-01-29

## 2024-02-12 ENCOUNTER — APPOINTMENT (OUTPATIENT)
Dept: PRIMARY CARE | Facility: CLINIC | Age: 44
End: 2024-02-12
Payer: COMMERCIAL

## 2024-02-13 ENCOUNTER — OFFICE VISIT (OUTPATIENT)
Dept: PRIMARY CARE | Facility: CLINIC | Age: 44
End: 2024-02-13
Payer: COMMERCIAL

## 2024-02-13 VITALS
WEIGHT: 293 LBS | HEART RATE: 72 BPM | OXYGEN SATURATION: 98 % | DIASTOLIC BLOOD PRESSURE: 80 MMHG | BODY MASS INDEX: 44.41 KG/M2 | HEIGHT: 68 IN | SYSTOLIC BLOOD PRESSURE: 138 MMHG | RESPIRATION RATE: 18 BRPM

## 2024-02-13 DIAGNOSIS — E66.01 MORBID OBESITY (MULTI): Primary | ICD-10-CM

## 2024-02-13 DIAGNOSIS — F32.A DEPRESSION, UNSPECIFIED DEPRESSION TYPE: ICD-10-CM

## 2024-02-13 DIAGNOSIS — F41.9 ANXIETY: ICD-10-CM

## 2024-02-13 DIAGNOSIS — Z00.00 HEALTHCARE MAINTENANCE: ICD-10-CM

## 2024-02-13 DIAGNOSIS — E78.00 HYPERCHOLESTEROLEMIA: ICD-10-CM

## 2024-02-13 DIAGNOSIS — G47.33 OBSTRUCTIVE SLEEP APNEA: ICD-10-CM

## 2024-02-13 DIAGNOSIS — I10 ESSENTIAL (PRIMARY) HYPERTENSION: ICD-10-CM

## 2024-02-13 DIAGNOSIS — E04.9 GOITER, NODULAR: ICD-10-CM

## 2024-02-13 DIAGNOSIS — J98.6 DIAPHRAGMATIC PARALYSIS: ICD-10-CM

## 2024-02-13 PROBLEM — O13.9 PREGNANCY INDUCED HYPERTENSION (HHS-HCC): Status: RESOLVED | Noted: 2024-02-13 | Resolved: 2024-02-13

## 2024-02-13 PROBLEM — N89.8 VAGINAL IRRITATION: Status: RESOLVED | Noted: 2023-11-27 | Resolved: 2024-02-13

## 2024-02-13 PROBLEM — M77.11 LATERAL EPICONDYLITIS OF RIGHT ELBOW: Status: RESOLVED | Noted: 2023-07-31 | Resolved: 2024-02-13

## 2024-02-13 PROBLEM — M79.673 FOOT PAIN: Status: RESOLVED | Noted: 2023-07-31 | Resolved: 2024-02-13

## 2024-02-13 PROCEDURE — 3075F SYST BP GE 130 - 139MM HG: CPT

## 2024-02-13 PROCEDURE — 99214 OFFICE O/P EST MOD 30 MIN: CPT

## 2024-02-13 PROCEDURE — 1036F TOBACCO NON-USER: CPT

## 2024-02-13 PROCEDURE — 3008F BODY MASS INDEX DOCD: CPT

## 2024-02-13 PROCEDURE — 3079F DIAST BP 80-89 MM HG: CPT

## 2024-02-13 RX ORDER — LOSARTAN POTASSIUM AND HYDROCHLOROTHIAZIDE 12.5; 1 MG/1; MG/1
1 TABLET ORAL DAILY
Qty: 90 TABLET | Refills: 0 | Status: SHIPPED | OUTPATIENT
Start: 2024-02-13 | End: 2024-05-13

## 2024-02-13 ASSESSMENT — ENCOUNTER SYMPTOMS
CHILLS: 0
PALPITATIONS: 0
DIARRHEA: 0
HEADACHES: 1
VOMITING: 0
SHORTNESS OF BREATH: 0
FATIGUE: 0
NAUSEA: 0
FEVER: 0
DIZZINESS: 0

## 2024-02-13 ASSESSMENT — PATIENT HEALTH QUESTIONNAIRE - PHQ9
SUM OF ALL RESPONSES TO PHQ9 QUESTIONS 1 AND 2: 0
1. LITTLE INTEREST OR PLEASURE IN DOING THINGS: NOT AT ALL
2. FEELING DOWN, DEPRESSED OR HOPELESS: NOT AT ALL

## 2024-02-13 ASSESSMENT — PAIN SCALES - GENERAL: PAINLEVEL: 0-NO PAIN

## 2024-02-13 NOTE — PROGRESS NOTES
Subjective   Patient ID: Natty Lord is a 43 y.o. female who presents for Follow-up.    HPI   43 y.o. female with PMH remarkable for HTN, obesity, Sleep apnea, disorder of diaphragm, HLD, Depression with anxiety, and multinodular goiter here today for follow up. Patient reports menstrual bleeding came early and lasted 3 weeks. Normally comes every 3 months with taking the Camrese OCP. Would like to discuss options with OBGYN for hysterectomy. States she was told in the past she was not able to have it because she was too young.     Disorder of diaphragm:  Saw pulmonology and was told she has a paralyzed right diaphragm.   Saw specialist in [place].      Sleep apnea:  Saw sleep medicine.  Has CPAP.  Takes 4 tylenol PM every night. Sleep medicine was ok with this.   Patient has tried other medications and melatonin with poor effect.      Obesity:  Last A1c was 5.3 in July 2023.  Continuing to gain weight.  Insurance won't cover weight loss medications.  Has focused on nutrition and exercise, but mentions every time her period comes she gets thrown off track. Has seen dietitian in the past prior to COVID.      HTN:  Losartan decreased to 100mg daily. Mentions she has had to take 100mg twice daily for increase in BP. Feels facial flushing and headache when BP rises. Has taken her BP at home which has run as high as 190s/120s.  Recommend bringing in cuff for calibration. Will consider changing medication.   Cannot tolerate ACE inhibitors due to cough.   Taking Metoprolol and Losartan.  BP today is 138/80.  Denies dizziness, chest pain, palpitations.     HLD:  Taking Simvastatin.  Last checked Nov 2022. Ordered today.     Depression/anxiety:  Taking Celexa.  Denies SI/HI.     Multinodular goiter:  Last checked April 2022 and was stable.  We did also find either a lymph node or benign parathyroid tumor.  She was to follow up with endocrine.  TSH was normal in July 2023.     Health maintenance:  Smoking: Former  "smoker.  Mammogram: October 2023.  Labs: Nov 2022  Colon cancer family history: Sep 2021. Every 5 years.  Influenza:       Review of Systems   Constitutional:  Negative for chills, fatigue and fever.   Respiratory:  Negative for shortness of breath.    Cardiovascular:  Negative for chest pain and palpitations.   Gastrointestinal:  Negative for diarrhea, nausea and vomiting.   Genitourinary:  Positive for menstrual problem and vaginal bleeding.   Neurological:  Positive for headaches. Negative for dizziness and syncope.   All other systems reviewed and are negative.      Objective   /80   Pulse 72   Resp 18   Ht 1.727 m (5' 8\")   Wt 148 kg (325 lb 9.6 oz)   SpO2 98%   BMI 49.51 kg/m²     Physical Exam  Vitals reviewed.   Constitutional:       Appearance: Normal appearance. She is obese.   HENT:      Head: Normocephalic and atraumatic.   Eyes:      Extraocular Movements: Extraocular movements intact.      Conjunctiva/sclera: Conjunctivae normal.   Cardiovascular:      Rate and Rhythm: Normal rate and regular rhythm.      Pulses: Normal pulses.      Heart sounds: Normal heart sounds.   Pulmonary:      Effort: Pulmonary effort is normal.      Breath sounds: Normal breath sounds.   Musculoskeletal:      Cervical back: Normal range of motion and neck supple.   Neurological:      General: No focal deficit present.      Mental Status: She is alert and oriented to person, place, and time.   Psychiatric:         Mood and Affect: Mood normal.         Behavior: Behavior normal.         Thought Content: Thought content normal.         Judgment: Judgment normal.         Assessment/Plan   Problem List Items Addressed This Visit             ICD-10-CM    Diaphragmatic paralysis J98.6    Hypercholesterolemia E78.00    Anxiety F41.9    Goiter, nodular E04.9    Depression F32.A    Morbid obesity (CMS/[facility]) - Primary E66.01    Relevant Orders    Referral to Nutrition Services    Obstructive sleep apnea G47.33     Other Visit " Diagnoses         Codes    Healthcare maintenance     Z00.00    Relevant Orders    Full code (Completed)    TSH with reflex to Free T4 if abnormal    Hemoglobin A1C    CBC and Auto Differential    Lipid Panel    Comprehensive Metabolic Panel    Vitamin D 25-Hydroxy,Total (for eval of Vitamin D levels)    Vitamin B12    Essential (primary) hypertension     I10    Relevant Medications    losartan-hydrochlorothiazide (Hyzaar) 100-12.5 mg tablet          - Recommend patient to follow up with dietitian for nutrition education for weight loss.  - Will switch Losartan 100mg daily to Losartan-Hydrochlorothiazide 100-12.5mg daily for blood pressure. Encourage patient to bring in at-home cuff for calibration. Recommend patient take BP at home at least 3 x weekly and call office in 1 week with log after beginning new medication.  - Recommend follow up with OBGYN for break-through bleeding episode and discussion for hysterectomy.   - Annual lab orders sent.  - Follow up with annual exam in 3 months.

## 2024-02-13 NOTE — PATIENT INSTRUCTIONS
BMI was above normal measurement. Current weight: 148 kg (325 lb 9.6 oz)  Weight change since last visit (-) denotes wt loss 3.8 lbs   Weight loss needed to achieve BMI 25: 161.5 Lbs  Weight loss needed to achieve BMI 30: 128.7 Lbs  Provided instructions on dietary changes  Provided instructions on exercise  Advised to Increase physical activity  Discussed follow up with dietitian for nutrition advice. Discussed other possible causes of weight gain including sleep and stress. Discussed ways to decrease stress. .

## 2024-03-07 ENCOUNTER — TELEMEDICINE CLINICAL SUPPORT (OUTPATIENT)
Dept: NUTRITION | Facility: CLINIC | Age: 44
End: 2024-03-07
Payer: COMMERCIAL

## 2024-03-07 DIAGNOSIS — Z71.3 DIETARY COUNSELING AND SURVEILLANCE: Primary | ICD-10-CM

## 2024-03-07 DIAGNOSIS — E66.01 MORBID OBESITY (MULTI): ICD-10-CM

## 2024-03-07 PROCEDURE — 97802 MEDICAL NUTRITION INDIV IN: CPT

## 2024-03-07 NOTE — PROGRESS NOTES
"NUTRITION ASSESSMENT NOTE    Reason for Nutrition Visit:  Pt is a 43 y.o. female being seen virtually for an initial appointment at [facility] referred for morbid obesity.      Pt states they seek  from dietitian for help losing weight through nutrition.     Anthropometrics:  Wt: 325#, 148kg  Ht: 5'8\"  BMI: 49.51 kg/m2      Past Medical Hx:  Patient Active Problem List   Diagnosis    Diaphragmatic paralysis    Suspected sleep apnea    Hypercholesterolemia    Mild HTN    Anxiety    Goiter, nodular    Abnormal WBC count    Depression    Disorder of iron metabolism    SMALL (dyspnea on exertion)    Leukopenia    Excessive daytime sleepiness    H/O hypercholesterolemia    Insomnia    Insulin resistance    Morbid obesity (CMS/HCC)    Obstructive sleep apnea    Polyp of colon    Rheumatoid arthritis (CMS/HCC)    Scarring of lung    Stress headache    Thyroid nodule    Vitamin D deficiency    Cervical high risk HPV (human papillomavirus) test positive    Post-nasal drainage    Encounter for annual routine gynecological examination    Surveillance for birth control, oral contraceptives    Encounter for screening mammogram for malignant neoplasm of breast          Lab Results   Component Value Date    HGBA1C 5.3 07/31/2023    CHOL 168 11/03/2022    LDLF 94 11/03/2022    TRIG 183 (H) 11/03/2022          Food and Nutrition Hx:  Pt admits to struggling with weight her whole life, and is now at the heaviest she has ever been. Pt believes that \"stress eating\" is her biggest issue. Pt mentions single parenting and COVID being triggers for stress and wt status, as well as health issues with her daughter. During that time, pt was doing the \"low carb\" diet pattern, where she lost weight (230#) and felt great. However, pt daughter health issues caused pt to stop adhering to \"low carb\" diet pattern and wt returned with additional sums. Pt is now actively going to counseling since November, which the RDN advocates for. Pt mentions " "being a  by [employer]     Pt has recently joined [employer], feeling that she needs something to help her track what she is eating, as well as her water and exercise intakes. Pt has been following new eating pattern x 3 weeks. She is having success doing this approach.     Pt gave up pop/diet pop for Lent.     RDN appreciates recent changes pt has made to her diet pattern since going to [employer], to include not excluding carbohydrates from her current eating pattern. RDN reinforced importance of lean protein, watching saturated fat intake, increasing plant-based food intake with emphasis on fruits, vegetables, and beans IOT reduce negative cardiovascular conditions and manifestations, and not viewing healthy carbohydrates as the enemy or as \"bad.\" Pt water intake improving toward 80oz per day, and soda pop consumption currently on hold and cheese intake lowered and modulated. RDN did impress the importance of keeping up with mental health counseling to address the emotional and stress-eating tie-in to dietary patterning.       DIETARY RECALL: *Pt consumes an average of 2-3 meals/day* w/ snacks  Wake-up: ~6am  Meal 1: Everyday, Protein powder (whey) w/ coffee, eggs x 3 w/ mushrooms or veggies, cottage cheese (low fat), low carb wrap  Meal 2: Everyday, Artichoke, banana, apple slices, chickpeas, Light and Fit coconut yogurt, chicken sausage w/ spinach and feta, turkey deli meat w/ lettuce  Meal 3: Everyday, [place], butternut squash w/ brown sugar, eggs, chicken breast, chicken thighs, ground chicken, brussels sprouts, salads, bean soup, pea soup w/ ham, black eyed peas and corn  Snacks: Banana, apple slices, [employer] chocolate caramel nut protein bar, cheese, berries  Bedtime: ~10pm-12am  Beverages: Water x ~48-80oz daily, Coffee (sugar-free creamer, almond milk) x 1 C daily, G2 Gatorade, [place] zero sugar, diet coke x 2-3 daily (currently on break)      NUTRITIONAL ARTIFACTS:  Likes eggs -- " owns and uses an air fryer -- prefers salty to sweet foods      Allergies: None  Intolerance: Lactose  Appetite: Good  Intake: 50-75% -- purposefully reduced to lose wt  GI Symptoms : None Frequency: absent  Swallowing Difficulty: No problems with swallowing    Supplements: Multivitamin, Vitamin D, and Magnesium daily    Food Preparation: Patient      Nutrition Focused Physical Exam:    Performed/Deferred: Deferred due to be being virtual visit      Estimated Energy Needs:    WEIGHT LOSS: 25-30 kcal/kg IBW = 9677-4143 kcal/day  PROTEIN Needs: 0.8-1 g/kg = 120-150 g/day    Nutrition Diagnosis:    Diagnosis Statement 1:  Diagnosis Status: New  Diagnosis : Inadequate fiber intake  related to food and nutrition related knowledge deficit concerning desirable quantities of fiber as evidenced by estimated intake of fiber that is insufficient when compared to recommended amounts (38 g/day for men and 25 g/day for women)    Diagnosis Statement 2:  Diagnosis Status: New  Diagnosis : Inadequate protein intake  related to food and nutrition related knowledge deficit concerning appropriate amount and type of dietary fat and/or protein as evidenced by  dietary recall reflecting daily intake of protein at levels <75% daily recommended needs    Diagnosis Statement 3:   Diagnosis Status: New  Diagnosis : Food and nutrition related knowledge deficit related to lack of or limited prior nutrition-related education as evidenced by verbalizes inaccurate or incomplete knowledge    Nutrition Interventions:  Anti-Inflammatory Diet, Consistent Carbohydrate Diet, Decreased Fat Diet, Increased Fiber Diet, Increased Fluid Intake, Increased Soluble Fiber, Increased Protein Diet, Low Saturated Fat Diet, Mediterranean Diet, and Plant Based Diet  Nutrition Counseling: Motivational Interviewing and Health Belief Model  Coordination of Care: None        Readiness to Change : Good  Level of Understanding : Good  Anticipated Compliant : Good

## 2024-05-10 DIAGNOSIS — I10 ESSENTIAL (PRIMARY) HYPERTENSION: ICD-10-CM

## 2024-05-13 ENCOUNTER — LAB (OUTPATIENT)
Dept: LAB | Facility: LAB | Age: 44
End: 2024-05-13
Payer: COMMERCIAL

## 2024-05-13 ENCOUNTER — OFFICE VISIT (OUTPATIENT)
Dept: PRIMARY CARE | Facility: CLINIC | Age: 44
End: 2024-05-13
Payer: COMMERCIAL

## 2024-05-13 VITALS
SYSTOLIC BLOOD PRESSURE: 137 MMHG | HEIGHT: 68 IN | OXYGEN SATURATION: 98 % | BODY MASS INDEX: 44.41 KG/M2 | WEIGHT: 293 LBS | HEART RATE: 70 BPM | DIASTOLIC BLOOD PRESSURE: 88 MMHG | RESPIRATION RATE: 18 BRPM

## 2024-05-13 DIAGNOSIS — I10 ESSENTIAL (PRIMARY) HYPERTENSION: Primary | ICD-10-CM

## 2024-05-13 DIAGNOSIS — J30.2 SEASONAL ALLERGIES: ICD-10-CM

## 2024-05-13 DIAGNOSIS — H92.03 ACUTE EAR PAIN, BILATERAL: ICD-10-CM

## 2024-05-13 DIAGNOSIS — Z00.00 HEALTHCARE MAINTENANCE: ICD-10-CM

## 2024-05-13 PROBLEM — Z86.2 HISTORY OF BLOOD DISORDER: Status: ACTIVE | Noted: 2024-05-13

## 2024-05-13 PROBLEM — G44.229 CHRONIC TENSION HEADACHE: Status: ACTIVE | Noted: 2024-05-13

## 2024-05-13 PROBLEM — R10.2 VAGINAL PAIN: Status: ACTIVE | Noted: 2024-05-13

## 2024-05-13 PROBLEM — G25.81 RESTLESS LEGS SYNDROME: Status: ACTIVE | Noted: 2024-05-13

## 2024-05-13 LAB
25(OH)D3 SERPL-MCNC: 35 NG/ML (ref 30–100)
ALBUMIN SERPL BCP-MCNC: 4.3 G/DL (ref 3.4–5)
ALP SERPL-CCNC: 60 U/L (ref 33–110)
ALT SERPL W P-5'-P-CCNC: 16 U/L (ref 7–45)
ANION GAP SERPL CALC-SCNC: 9 MMOL/L (ref 10–20)
AST SERPL W P-5'-P-CCNC: 14 U/L (ref 9–39)
BASOPHILS # BLD AUTO: 0.04 X10*3/UL (ref 0–0.1)
BASOPHILS NFR BLD AUTO: 0.6 %
BILIRUB SERPL-MCNC: 0.6 MG/DL (ref 0–1.2)
BUN SERPL-MCNC: 11 MG/DL (ref 6–23)
CALCIUM SERPL-MCNC: 9.6 MG/DL (ref 8.6–10.3)
CHLORIDE SERPL-SCNC: 103 MMOL/L (ref 98–107)
CHOLEST SERPL-MCNC: 165 MG/DL (ref 0–199)
CHOLESTEROL/HDL RATIO: 4.5
CO2 SERPL-SCNC: 29 MMOL/L (ref 21–32)
CREAT SERPL-MCNC: 0.71 MG/DL (ref 0.5–1.05)
EGFRCR SERPLBLD CKD-EPI 2021: >90 ML/MIN/1.73M*2
EOSINOPHIL # BLD AUTO: 0.07 X10*3/UL (ref 0–0.7)
EOSINOPHIL NFR BLD AUTO: 1.1 %
ERYTHROCYTE [DISTWIDTH] IN BLOOD BY AUTOMATED COUNT: 13.3 % (ref 11.5–14.5)
EST. AVERAGE GLUCOSE BLD GHB EST-MCNC: 100 MG/DL
GLUCOSE SERPL-MCNC: 82 MG/DL (ref 74–99)
HBA1C MFR BLD: 5.1 %
HCT VFR BLD AUTO: 41.4 % (ref 36–46)
HDLC SERPL-MCNC: 36.6 MG/DL
HGB BLD-MCNC: 13 G/DL (ref 12–16)
IMM GRANULOCYTES # BLD AUTO: 0.02 X10*3/UL (ref 0–0.7)
IMM GRANULOCYTES NFR BLD AUTO: 0.3 % (ref 0–0.9)
LDLC SERPL CALC-MCNC: 82 MG/DL
LYMPHOCYTES # BLD AUTO: 1.31 X10*3/UL (ref 1.2–4.8)
LYMPHOCYTES NFR BLD AUTO: 20.5 %
MCH RBC QN AUTO: 30.3 PG (ref 26–34)
MCHC RBC AUTO-ENTMCNC: 31.4 G/DL (ref 32–36)
MCV RBC AUTO: 97 FL (ref 80–100)
MONOCYTES # BLD AUTO: 0.45 X10*3/UL (ref 0.1–1)
MONOCYTES NFR BLD AUTO: 7 %
NEUTROPHILS # BLD AUTO: 4.51 X10*3/UL (ref 1.2–7.7)
NEUTROPHILS NFR BLD AUTO: 70.5 %
NON HDL CHOLESTEROL: 128 MG/DL (ref 0–149)
NRBC BLD-RTO: 0 /100 WBCS (ref 0–0)
PLATELET # BLD AUTO: 322 X10*3/UL (ref 150–450)
POTASSIUM SERPL-SCNC: 4.4 MMOL/L (ref 3.5–5.3)
PROT SERPL-MCNC: 7.1 G/DL (ref 6.4–8.2)
RBC # BLD AUTO: 4.29 X10*6/UL (ref 4–5.2)
SODIUM SERPL-SCNC: 137 MMOL/L (ref 136–145)
TRIGL SERPL-MCNC: 230 MG/DL (ref 0–149)
TSH SERPL-ACNC: 1.36 MIU/L (ref 0.44–3.98)
VIT B12 SERPL-MCNC: 302 PG/ML (ref 211–911)
VLDL: 46 MG/DL (ref 0–40)
WBC # BLD AUTO: 6.4 X10*3/UL (ref 4.4–11.3)

## 2024-05-13 PROCEDURE — 82306 VITAMIN D 25 HYDROXY: CPT

## 2024-05-13 PROCEDURE — 3008F BODY MASS INDEX DOCD: CPT

## 2024-05-13 PROCEDURE — 3075F SYST BP GE 130 - 139MM HG: CPT

## 2024-05-13 PROCEDURE — 82607 VITAMIN B-12: CPT

## 2024-05-13 PROCEDURE — 3079F DIAST BP 80-89 MM HG: CPT

## 2024-05-13 PROCEDURE — 99213 OFFICE O/P EST LOW 20 MIN: CPT

## 2024-05-13 PROCEDURE — 83036 HEMOGLOBIN GLYCOSYLATED A1C: CPT

## 2024-05-13 PROCEDURE — 36415 COLL VENOUS BLD VENIPUNCTURE: CPT

## 2024-05-13 PROCEDURE — 1036F TOBACCO NON-USER: CPT

## 2024-05-13 RX ORDER — LOSARTAN POTASSIUM AND HYDROCHLOROTHIAZIDE 12.5; 1 MG/1; MG/1
1 TABLET ORAL DAILY
Qty: 90 TABLET | Refills: 0 | Status: SHIPPED | OUTPATIENT
Start: 2024-05-13

## 2024-05-13 ASSESSMENT — ENCOUNTER SYMPTOMS
SORE THROAT: 0
FATIGUE: 0
DIARRHEA: 0
ABDOMINAL PAIN: 0
CHILLS: 0
SINUS PAIN: 0
CONSTIPATION: 0
SINUS PRESSURE: 0
COUGH: 0
VOMITING: 0
NAUSEA: 0
DIZZINESS: 0
SHORTNESS OF BREATH: 0
HEADACHES: 0
FEVER: 0

## 2024-05-13 ASSESSMENT — PAIN SCALES - GENERAL: PAINLEVEL: 0-NO PAIN

## 2024-05-13 NOTE — PROGRESS NOTES
"Subjective   Patient ID: Natty Lord is a 43 y.o. female who presents for Follow-up (Bilateral Earache pain comes and goes).    HPI   43 y.o. female with PMH remarkable for HTN, obesity, Sleep apnea, disorder of diaphragm, HLD, Depression with anxiety, and multinodular goiter here today for follow up.     BP:  137/88 today in office  Hasn't been taking at home  Denies headaches, blurred vision, chest pain, SOB.    Bilateral Ear pain:  - Went to urgent care when she came back from [place] 4/21 was told it was a middle ear infection and given Amoxicillin  - Now has alternating pain off and on  - Does have history of seasonal allergies and takes Zyrtec every day. Uses Astepro as needed    Weight loss:  - Followed up with dietitian, not helpful  - Is doing Weight watchers - down 20lbs    Denies any other symptoms today.     Review of Systems   Constitutional:  Negative for chills, fatigue and fever.   HENT:  Positive for ear pain. Negative for ear discharge, postnasal drip, sinus pressure, sinus pain and sore throat.    Respiratory:  Negative for cough and shortness of breath.    Cardiovascular:  Negative for chest pain.   Gastrointestinal:  Negative for abdominal pain, constipation, diarrhea, nausea and vomiting.   Neurological:  Negative for dizziness and headaches.   All other systems reviewed and are negative.      Objective   /88   Pulse 70   Resp 18   Ht 1.727 m (5' 8\")   Wt 142 kg (313 lb 3.2 oz)   SpO2 98%   BMI 47.62 kg/m²     Physical Exam  Constitutional:       Appearance: Normal appearance.   HENT:      Head: Normocephalic and atraumatic.      Right Ear: Tympanic membrane, ear canal and external ear normal.      Left Ear: Tympanic membrane, ear canal and external ear normal.      Mouth/Throat:      Pharynx: No oropharyngeal exudate or posterior oropharyngeal erythema.   Eyes:      Extraocular Movements: Extraocular movements intact.      Conjunctiva/sclera: Conjunctivae normal. "   Cardiovascular:      Rate and Rhythm: Normal rate and regular rhythm.   Pulmonary:      Effort: Pulmonary effort is normal.      Breath sounds: Normal breath sounds. No wheezing, rhonchi or rales.   Musculoskeletal:      Cervical back: Normal range of motion and neck supple.   Neurological:      General: No focal deficit present.      Mental Status: She is alert and oriented to person, place, and time. Mental status is at baseline.   Psychiatric:         Mood and Affect: Mood normal.         Behavior: Behavior normal.         Thought Content: Thought content normal.         Judgment: Judgment normal.         Assessment/Plan   Problem List Items Addressed This Visit             ICD-10-CM    Essential (primary) hypertension - Primary I10    BMI 45.0-49.9, adult (Multi) Z68.42    Acute ear pain, bilateral H92.03    Seasonal allergies J30.2     - Chronic conditions stable. Continue with current medications and doses.  - Recommend nasal saline for allergies and ear pain. Follow up if symptoms persist.   - Annual blood work ordered. Will follow up with results.   - Follow up in 4 months.

## 2024-05-14 DIAGNOSIS — R79.89 LOW VITAMIN B12 LEVEL: ICD-10-CM

## 2024-05-14 DIAGNOSIS — R79.89 LOW VITAMIN D LEVEL: Primary | ICD-10-CM

## 2024-05-14 DIAGNOSIS — E78.1 HYPERTRIGLYCERIDEMIA: ICD-10-CM

## 2024-05-14 DIAGNOSIS — E53.8 VITAMIN B12 DEFICIENCY: ICD-10-CM

## 2024-05-14 RX ORDER — CYANOCOBALAMIN 1000 UG/ML
1000 INJECTION, SOLUTION INTRAMUSCULAR; SUBCUTANEOUS ONCE
Qty: 1 ML | Refills: 0 | Status: SHIPPED | OUTPATIENT
Start: 2024-05-14 | End: 2024-06-10

## 2024-05-14 RX ORDER — ASCORBIC ACID
1 CRYSTALS ORAL ONCE
COMMUNITY
End: 2024-05-14

## 2024-05-14 RX ORDER — ERGOCALCIFEROL 1.25 MG/1
50000 CAPSULE ORAL
Qty: 12 CAPSULE | Refills: 0 | Status: SHIPPED | OUTPATIENT
Start: 2024-05-19 | End: 2024-08-11

## 2024-05-14 RX ORDER — SYRINGE W-NEEDLE,DISPOSAB,3 ML 25GX5/8"
SYRINGE, EMPTY DISPOSABLE MISCELLANEOUS
Qty: 1 EACH | Refills: 0 | Status: SHIPPED | OUTPATIENT
Start: 2024-05-14

## 2024-06-03 DIAGNOSIS — I10 HYPERTENSION, UNSPECIFIED TYPE: ICD-10-CM

## 2024-06-03 RX ORDER — SIMVASTATIN 20 MG/1
20 TABLET, FILM COATED ORAL DAILY
Qty: 90 TABLET | Refills: 0 | Status: SHIPPED | OUTPATIENT
Start: 2024-06-03

## 2024-07-07 DIAGNOSIS — R79.89 LOW VITAMIN D LEVEL: ICD-10-CM

## 2024-07-08 RX ORDER — ERGOCALCIFEROL 1.25 MG/1
50000 CAPSULE ORAL
Qty: 12 CAPSULE | Refills: 0 | OUTPATIENT
Start: 2024-07-14

## 2024-07-16 DIAGNOSIS — F41.9 ANXIETY: ICD-10-CM

## 2024-07-16 RX ORDER — CITALOPRAM 20 MG/1
TABLET, FILM COATED ORAL
Qty: 90 TABLET | Refills: 0 | Status: SHIPPED | OUTPATIENT
Start: 2024-07-16

## 2024-07-22 ENCOUNTER — PATIENT MESSAGE (OUTPATIENT)
Dept: PRIMARY CARE | Facility: CLINIC | Age: 44
End: 2024-07-22
Payer: COMMERCIAL

## 2024-08-08 DIAGNOSIS — I10 ESSENTIAL (PRIMARY) HYPERTENSION: ICD-10-CM

## 2024-08-08 RX ORDER — LOSARTAN POTASSIUM AND HYDROCHLOROTHIAZIDE 12.5; 1 MG/1; MG/1
1 TABLET ORAL DAILY
Qty: 90 TABLET | Refills: 1 | Status: SHIPPED | OUTPATIENT
Start: 2024-08-08

## 2024-08-31 DIAGNOSIS — I10 HYPERTENSION, UNSPECIFIED TYPE: ICD-10-CM

## 2024-09-03 RX ORDER — SIMVASTATIN 20 MG/1
20 TABLET, FILM COATED ORAL DAILY
Qty: 90 TABLET | Refills: 0 | Status: SHIPPED | OUTPATIENT
Start: 2024-09-03 | End: 2024-09-05 | Stop reason: SDUPTHER

## 2024-09-04 ENCOUNTER — APPOINTMENT (OUTPATIENT)
Dept: BEHAVIORAL HEALTH | Facility: CLINIC | Age: 44
End: 2024-09-04
Payer: COMMERCIAL

## 2024-09-04 DIAGNOSIS — F41.1 GENERALIZED ANXIETY DISORDER: ICD-10-CM

## 2024-09-04 PROCEDURE — 90791 PSYCH DIAGNOSTIC EVALUATION: CPT | Performed by: PSYCHOLOGIST

## 2024-09-04 PROCEDURE — 1036F TOBACCO NON-USER: CPT | Performed by: PSYCHOLOGIST

## 2024-09-05 ENCOUNTER — APPOINTMENT (OUTPATIENT)
Dept: PRIMARY CARE | Facility: CLINIC | Age: 44
End: 2024-09-05
Payer: COMMERCIAL

## 2024-09-05 DIAGNOSIS — U07.1 COVID: Primary | ICD-10-CM

## 2024-09-05 DIAGNOSIS — E53.8 VITAMIN B12 DEFICIENCY: ICD-10-CM

## 2024-09-05 DIAGNOSIS — I10 ESSENTIAL (PRIMARY) HYPERTENSION: ICD-10-CM

## 2024-09-05 DIAGNOSIS — F41.9 ANXIETY: ICD-10-CM

## 2024-09-05 DIAGNOSIS — I10 HYPERTENSION, UNSPECIFIED TYPE: ICD-10-CM

## 2024-09-05 PROCEDURE — 99213 OFFICE O/P EST LOW 20 MIN: CPT

## 2024-09-05 PROCEDURE — 1036F TOBACCO NON-USER: CPT

## 2024-09-05 RX ORDER — METOPROLOL SUCCINATE 100 MG/1
100 TABLET, EXTENDED RELEASE ORAL DAILY
Qty: 90 TABLET | Refills: 1 | Status: SHIPPED | OUTPATIENT
Start: 2024-09-05

## 2024-09-05 RX ORDER — CYANOCOBALAMIN 1000 UG/ML
1000 INJECTION, SOLUTION INTRAMUSCULAR; SUBCUTANEOUS
COMMUNITY
Start: 2024-07-21 | End: 2024-09-05 | Stop reason: SDUPTHER

## 2024-09-05 RX ORDER — SIMVASTATIN 20 MG/1
20 TABLET, FILM COATED ORAL DAILY
Qty: 90 TABLET | Refills: 1 | Status: SHIPPED | OUTPATIENT
Start: 2024-09-05

## 2024-09-05 RX ORDER — CYANOCOBALAMIN 1000 UG/ML
INJECTION, SOLUTION INTRAMUSCULAR; SUBCUTANEOUS
Qty: 14 ML | Refills: 0 | Status: SHIPPED | OUTPATIENT
Start: 2024-09-05

## 2024-09-05 RX ORDER — LOSARTAN POTASSIUM AND HYDROCHLOROTHIAZIDE 12.5; 1 MG/1; MG/1
1 TABLET ORAL DAILY
Qty: 90 TABLET | Refills: 1 | Status: SHIPPED | OUTPATIENT
Start: 2024-09-05

## 2024-09-05 RX ORDER — BISMUTH SUBSALICYLATE 262 MG
1 TABLET,CHEWABLE ORAL DAILY
COMMUNITY

## 2024-09-05 RX ORDER — CITALOPRAM 20 MG/1
TABLET, FILM COATED ORAL
Qty: 90 TABLET | Refills: 1 | Status: SHIPPED | OUTPATIENT
Start: 2024-09-05

## 2024-09-05 RX ORDER — SYRINGE W-NEEDLE,DISPOSAB,3 ML 25GX5/8"
SYRINGE, EMPTY DISPOSABLE MISCELLANEOUS
Qty: 14 EACH | Refills: 0 | Status: SHIPPED | OUTPATIENT
Start: 2024-09-05

## 2024-09-05 ASSESSMENT — ENCOUNTER SYMPTOMS
VOMITING: 0
ABDOMINAL PAIN: 0
CONSTIPATION: 0
DIARRHEA: 0
NAUSEA: 0
SORE THROAT: 0
FATIGUE: 1
CHILLS: 1
FEVER: 1
HEADACHES: 1
DIZZINESS: 0
SHORTNESS OF BREATH: 0
RHINORRHEA: 1

## 2024-09-05 NOTE — PROGRESS NOTES
Subjective   Patient ID: Natty Lord is a 43 y.o. female who presents for Cough, Sinusitis, and Fever (Covid positive 9/3).    HPI   This visit was completed via virtual telehealth.   All issues as below were discussed and addressed but no physical exam was performed.  If it was felt that the patient should be evaluated in clinic then they were directed there.   The patient verbally consented to visit.    43 y.o. female with PMH remarkable for HTN, obesity, Sleep apnea, disorder of diaphragm, HLD, Depression with anxiety, and multinodular goiter here today for 4 month follow up and tested positive for COVID 9/3 with at-home test.     COVID:  - Sneezing, coughing, congestion, fever (99), chills, headache  - Has taken tylenol and thera-flu   - Denies chest pain, SOB, N/V/D    BP:  - No swelling in feet  - Medications seem to be working, not experiencing symptoms  - Hasn't taken BP at home  -Denies chest pain, SOB, headache, blurred vision, dizziness      Review of Systems   Constitutional:  Positive for chills, fatigue and fever.   HENT:  Positive for congestion, rhinorrhea and sneezing. Negative for sore throat.    Respiratory:  Negative for shortness of breath.    Cardiovascular:  Negative for chest pain and leg swelling.   Gastrointestinal:  Negative for abdominal pain, constipation, diarrhea, nausea and vomiting.   Neurological:  Positive for headaches. Negative for dizziness.   All other systems reviewed and are negative.      Objective   There were no vitals taken for this visit.    Physical Exam  Nursing note (This exam was limited as it was completed via telehealth) reviewed.   Constitutional:       Appearance: Normal appearance. She is obese.   HENT:      Head: Normocephalic and atraumatic.      Nose: Congestion present.   Neurological:      General: No focal deficit present.      Mental Status: She is alert and oriented to person, place, and time. Mental status is at baseline.   Psychiatric:         Mood  and Affect: Mood normal.         Behavior: Behavior normal.         Thought Content: Thought content normal.         Judgment: Judgment normal.         Assessment/Plan   Problem List Items Addressed This Visit             ICD-10-CM    Essential (primary) hypertension I10    Relevant Medications    metoprolol succinate XL (Toprol-XL) 100 mg 24 hr tablet    losartan-hydrochlorothiazide (Hyzaar) 100-12.5 mg tablet    Anxiety F41.9    Relevant Medications    citalopram (CeleXA) 20 mg tablet    COVID - Primary U07.1     Other Visit Diagnoses         Codes    Hypertension, unspecified type     I10    Relevant Medications    simvastatin (Zocor) 20 mg tablet          - Patient to complete Vitamin D level recheck. Can take 9224-0956 International units of Vitamin D3 + K2 daily for maintenance dose.  - Patient will continue to complete vitamin B12 injections at home.  - Discussed symptomatic care for COVID including rest and increased hydration. Can use tylenol for fever control.   - Refills placed.  - Follow up in 4 months.

## 2024-09-12 ENCOUNTER — APPOINTMENT (OUTPATIENT)
Dept: PRIMARY CARE | Facility: CLINIC | Age: 44
End: 2024-09-12
Payer: COMMERCIAL

## 2024-09-24 ENCOUNTER — APPOINTMENT (OUTPATIENT)
Dept: BEHAVIORAL HEALTH | Facility: CLINIC | Age: 44
End: 2024-09-24
Payer: COMMERCIAL

## 2024-09-24 DIAGNOSIS — F41.1 GENERALIZED ANXIETY DISORDER: ICD-10-CM

## 2024-09-24 PROCEDURE — 1036F TOBACCO NON-USER: CPT | Performed by: PSYCHOLOGIST

## 2024-09-24 PROCEDURE — 90837 PSYTX W PT 60 MINUTES: CPT | Performed by: PSYCHOLOGIST

## 2024-09-24 NOTE — PROGRESS NOTES
11:00am to 11:55am  Met with client today for her individual session via telehealth.  Client was given an ADHD specific assessment interview.  Upon completion of the assessment client's interview was scored and client does not have ADHD.  Client will be given her results during the next session.

## 2024-10-07 ENCOUNTER — APPOINTMENT (OUTPATIENT)
Dept: BEHAVIORAL HEALTH | Facility: CLINIC | Age: 44
End: 2024-10-07
Payer: COMMERCIAL

## 2024-10-07 DIAGNOSIS — F41.1 GENERALIZED ANXIETY DISORDER: ICD-10-CM

## 2024-10-07 PROCEDURE — 90832 PSYTX W PT 30 MINUTES: CPT | Performed by: PSYCHOLOGIST

## 2024-10-07 PROCEDURE — 1036F TOBACCO NON-USER: CPT | Performed by: PSYCHOLOGIST

## 2024-10-07 NOTE — PROGRESS NOTES
9:00am to 9:30am  Met with client today for her individual session via [employer].  Client was given the results of her ADHD testing.  Client does not have ADHD. Client was given a referral for cognitive testing.    Client's case is currently being closed as her assessment has been completed.

## 2024-12-05 DIAGNOSIS — Z30.41 SURVEILLANCE FOR BIRTH CONTROL, ORAL CONTRACEPTIVES: ICD-10-CM

## 2024-12-05 RX ORDER — LEVONORGESTREL / ETHINYL ESTRADIOL AND ETHINYL ESTRADIOL 150-30(84)
1 KIT ORAL DAILY
Qty: 91 TABLET | Refills: 1 | Status: SHIPPED | OUTPATIENT
Start: 2024-12-05

## 2025-01-03 ENCOUNTER — TELEPHONE (OUTPATIENT)
Dept: PRIMARY CARE | Facility: CLINIC | Age: 45
End: 2025-01-03
Payer: COMMERCIAL

## 2025-01-03 DIAGNOSIS — R05.1 ACUTE COUGH: Primary | ICD-10-CM

## 2025-01-03 RX ORDER — AMOXICILLIN 500 MG/1
500 CAPSULE ORAL EVERY 8 HOURS SCHEDULED
Qty: 30 CAPSULE | Refills: 0 | Status: SHIPPED | OUTPATIENT
Start: 2025-01-03 | End: 2025-01-13

## 2025-01-03 RX ORDER — PREDNISONE 20 MG/1
40 TABLET ORAL DAILY
Qty: 10 TABLET | Refills: 0 | Status: SHIPPED | OUTPATIENT
Start: 2025-01-03 | End: 2025-01-08

## 2025-01-03 RX ORDER — GUAIFENESIN 600 MG/1
600 TABLET, EXTENDED RELEASE ORAL 2 TIMES DAILY
Qty: 10 TABLET | Refills: 0 | Status: SHIPPED | OUTPATIENT
Start: 2025-01-03 | End: 2025-01-08

## 2025-01-03 NOTE — TELEPHONE ENCOUNTER
Dry nagging cough- yellow phlegm, fever, body aches x 5 days   Covid negative  Seen telehealth, gave tessalon   Req steroid ?    Allergy: azithromycin,bupropion

## 2025-01-10 ENCOUNTER — APPOINTMENT (OUTPATIENT)
Dept: OBSTETRICS AND GYNECOLOGY | Facility: CLINIC | Age: 45
End: 2025-01-10
Payer: COMMERCIAL

## 2025-01-24 ENCOUNTER — APPOINTMENT (OUTPATIENT)
Dept: OBSTETRICS AND GYNECOLOGY | Facility: CLINIC | Age: 45
End: 2025-01-24
Payer: COMMERCIAL

## 2025-02-13 ENCOUNTER — APPOINTMENT (OUTPATIENT)
Dept: OBSTETRICS AND GYNECOLOGY | Facility: CLINIC | Age: 45
End: 2025-02-13
Payer: COMMERCIAL

## 2025-02-19 ENCOUNTER — APPOINTMENT (OUTPATIENT)
Dept: OBSTETRICS AND GYNECOLOGY | Facility: CLINIC | Age: 45
End: 2025-02-19
Payer: COMMERCIAL

## 2025-02-19 DIAGNOSIS — Z12.31 ENCOUNTER FOR SCREENING MAMMOGRAM FOR MALIGNANT NEOPLASM OF BREAST: ICD-10-CM

## 2025-02-19 DIAGNOSIS — Z30.41 SURVEILLANCE FOR BIRTH CONTROL, ORAL CONTRACEPTIVES: ICD-10-CM

## 2025-02-19 DIAGNOSIS — Z01.419 ENCOUNTER FOR ANNUAL ROUTINE GYNECOLOGICAL EXAMINATION: Primary | ICD-10-CM

## 2025-02-19 NOTE — PROGRESS NOTES
Me 2023  91 day OCP; hx HTN on meds  UTD on pap smear, next due after 2025.  Due for mammogram.    Subjective   Natty Lord is a 44 y.o. female who is here for a routine exam.     Complaints:   ***  Periods: regular ***  Dysmenorrhea:  none    Current contraception: ***  History of abnormal Pap smear: no  History of abnormal mammogram: no      OB History          1    Para   1    Term   1            AB        Living             SAB        IAB        Ectopic        Multiple        Live Births                      Review of Systems    Objective   There were no vitals taken for this visit.       General:   Alert and oriented, in no acute distress   Neck: Supple. No visible thyromegaly.    Breast/Axilla: Normal to palpation bilaterally without masses, skin changes, or nipple discharge.    Abdomen: Soft, non-tender, without masses or organomegaly   Vulva: Normal architecture without erythema, masses, or lesions.    Vagina: Normal mucosa without lesions, masses, or atrophy. No abnormal vaginal discharge.    Cervix: Normal without masses, lesions, or signs of cervicitis   Uterus: Normal, mobile, non-enlarged uterus   Adnexa: Normal without masses or lesions   Pelvic Floor normal   Psych Normal affect. Normal mood.      Assessment/Plan   {Assess/Plan SmartLinks (Optional):85620}      Mitali Benitez PA-C

## 2025-02-25 DIAGNOSIS — E53.8 VITAMIN B12 DEFICIENCY: ICD-10-CM

## 2025-02-25 RX ORDER — CYANOCOBALAMIN 1000 UG/ML
INJECTION, SOLUTION INTRAMUSCULAR; SUBCUTANEOUS
Qty: 5 ML | Refills: 2 | Status: SHIPPED | OUTPATIENT
Start: 2025-02-25

## 2025-03-20 ENCOUNTER — OFFICE VISIT (OUTPATIENT)
Dept: OBSTETRICS AND GYNECOLOGY | Facility: CLINIC | Age: 45
End: 2025-03-20
Payer: COMMERCIAL

## 2025-03-20 VITALS
HEIGHT: 68 IN | SYSTOLIC BLOOD PRESSURE: 139 MMHG | WEIGHT: 293 LBS | DIASTOLIC BLOOD PRESSURE: 84 MMHG | BODY MASS INDEX: 44.41 KG/M2

## 2025-03-20 DIAGNOSIS — Z12.31 ENCOUNTER FOR SCREENING MAMMOGRAM FOR MALIGNANT NEOPLASM OF BREAST: ICD-10-CM

## 2025-03-20 DIAGNOSIS — Z01.419 ENCOUNTER FOR ANNUAL ROUTINE GYNECOLOGICAL EXAMINATION: Primary | ICD-10-CM

## 2025-03-20 DIAGNOSIS — Z30.41 SURVEILLANCE FOR BIRTH CONTROL, ORAL CONTRACEPTIVES: ICD-10-CM

## 2025-03-20 PROCEDURE — 1036F TOBACCO NON-USER: CPT

## 2025-03-20 PROCEDURE — 99396 PREV VISIT EST AGE 40-64: CPT

## 2025-03-20 PROCEDURE — 3079F DIAST BP 80-89 MM HG: CPT

## 2025-03-20 PROCEDURE — 3008F BODY MASS INDEX DOCD: CPT

## 2025-03-20 PROCEDURE — 3075F SYST BP GE 130 - 139MM HG: CPT

## 2025-03-20 RX ORDER — LEVONORGESTREL / ETHINYL ESTRADIOL AND ETHINYL ESTRADIOL 150-30(84)
1 KIT ORAL DAILY
Qty: 91 TABLET | Refills: 3 | Status: SHIPPED | OUTPATIENT
Start: 2025-03-20 | End: 2026-03-20

## 2025-03-20 ASSESSMENT — ENCOUNTER SYMPTOMS
DYSURIA: 0
VOMITING: 0
SHORTNESS OF BREATH: 0
FEVER: 0
UNEXPECTED WEIGHT CHANGE: 1
CHILLS: 0
FATIGUE: 0
DEPRESSION: 0
OCCASIONAL FEELINGS OF UNSTEADINESS: 0
LOSS OF SENSATION IN FEET: 0
HEADACHES: 0
NAUSEA: 0
ABDOMINAL PAIN: 0
COUGH: 0
DIZZINESS: 0
COLOR CHANGE: 0

## 2025-03-20 ASSESSMENT — PAIN SCALES - GENERAL: PAINLEVEL_OUTOF10: 0-NO PAIN

## 2025-03-20 ASSESSMENT — LIFESTYLE VARIABLES
HOW MANY STANDARD DRINKS CONTAINING ALCOHOL DO YOU HAVE ON A TYPICAL DAY: PATIENT DOES NOT DRINK
SKIP TO QUESTIONS 9-10: 1
HOW OFTEN DO YOU HAVE A DRINK CONTAINING ALCOHOL: NEVER
AUDIT-C TOTAL SCORE: 0
HOW OFTEN DO YOU HAVE SIX OR MORE DRINKS ON ONE OCCASION: NEVER

## 2025-03-20 NOTE — PROGRESS NOTES
"Subjective   Natty Lord is a 44 y.o. female who is here for a routine GYN exam. Last saw her 2023.  -On OCP; tolerating well; will sometimes skip placebo pills and take continuously, allowing withdrawal bleed q6 months.   - Hx of HTN as of more recent well managed on medications, followed by PCP. No CI to OCP use.  -Denies breast or vaginal concerns today.  -Struggling with weight gain; will have annual physical in  with PCP; she plans to attempt weight watchers again.    Complaints:   see hpi  Periods: extended cycle OCP  Dysmenorrhea:  none    Current contraception: OCP  History of abnormal Pap smear: no  History of abnormal mammogram: no      OB History          1    Para   1    Term   1            AB        Living   1         SAB        IAB        Ectopic        Multiple        Live Births   1                  Review of Systems   Constitutional:  Positive for unexpected weight change. Negative for chills, fatigue and fever.   Respiratory:  Negative for cough and shortness of breath.    Gastrointestinal:  Negative for abdominal pain, nausea and vomiting.   Genitourinary:  Negative for dyspareunia, dysuria, pelvic pain and vaginal discharge.   Skin:  Negative for color change and rash.   Neurological:  Negative for dizziness and headaches.       Objective   /84   Ht 1.727 m (5' 8\")   Wt (!) 151 kg (333 lb)   LMP 2025   BMI 50.63 kg/m²        General:   Alert and oriented, in no acute distress   Neck: Supple. No visible thyromegaly.    Breast/Axilla: Normal to palpation bilaterally without masses, skin changes, or nipple discharge.    Abdomen: Soft, non-tender, without masses or organomegaly; BMI 50.63   Vulva: Normal architecture without erythema, masses, or lesions.    Vagina: Normal mucosa without lesions, masses, or atrophy. No abnormal vaginal discharge.    Cervix: Normal without masses, lesions, or signs of cervicitis   Uterus: Grossly normal but limited due to body " habitus   Adnexa: Grossly normal but limited due to body habitus   Pelvic Floor Normal    Psych Normal affect. Normal mood.      Assessment/Plan   Diagnoses and all orders for this visit:  Encounter for annual routine gynecological examination   - UTD on pap smear, next due after 10/2025.   - Encouraged high protein diet, aerobic+strength training 4-5x/week, increase water intake, and good sleep hygiene.  Surveillance for birth control, oral contraceptives  -     L norgest/e.estradioL-e.estrad (Seasonique) 0.15 mg-30 mcg (84)/10 mcg (7) tablets,dose pack,3 month tablet; Take 1 tablet by mouth once daily.  Encounter for screening mammogram for malignant neoplasm of breast  -     BI mammo bilateral screening tomosynthesis; Future      Mitali Benitez PA-C

## 2025-04-08 ENCOUNTER — APPOINTMENT (OUTPATIENT)
Dept: RADIOLOGY | Facility: CLINIC | Age: 45
End: 2025-04-08
Payer: COMMERCIAL

## 2025-04-16 ENCOUNTER — HOSPITAL ENCOUNTER (OUTPATIENT)
Dept: RADIOLOGY | Facility: CLINIC | Age: 45
Discharge: HOME | End: 2025-04-16
Payer: COMMERCIAL

## 2025-04-16 VITALS — BODY MASS INDEX: 44.41 KG/M2 | HEIGHT: 68 IN | WEIGHT: 293 LBS

## 2025-04-16 DIAGNOSIS — Z12.31 ENCOUNTER FOR SCREENING MAMMOGRAM FOR MALIGNANT NEOPLASM OF BREAST: ICD-10-CM

## 2025-04-16 PROCEDURE — 77067 SCR MAMMO BI INCL CAD: CPT | Performed by: STUDENT IN AN ORGANIZED HEALTH CARE EDUCATION/TRAINING PROGRAM

## 2025-04-16 PROCEDURE — 77067 SCR MAMMO BI INCL CAD: CPT

## 2025-04-16 PROCEDURE — 77063 BREAST TOMOSYNTHESIS BI: CPT | Performed by: STUDENT IN AN ORGANIZED HEALTH CARE EDUCATION/TRAINING PROGRAM

## 2025-04-21 DIAGNOSIS — F41.9 ANXIETY: ICD-10-CM

## 2025-04-21 DIAGNOSIS — I10 ESSENTIAL (PRIMARY) HYPERTENSION: ICD-10-CM

## 2025-04-21 RX ORDER — METOPROLOL SUCCINATE 100 MG/1
100 TABLET, EXTENDED RELEASE ORAL DAILY
Qty: 90 TABLET | Refills: 1 | Status: SHIPPED | OUTPATIENT
Start: 2025-04-21

## 2025-04-21 RX ORDER — CITALOPRAM 20 MG/1
TABLET, FILM COATED ORAL
Qty: 90 TABLET | Refills: 1 | Status: SHIPPED | OUTPATIENT
Start: 2025-04-21

## 2025-07-15 DIAGNOSIS — I10 HYPERTENSION, UNSPECIFIED TYPE: ICD-10-CM

## 2025-07-15 RX ORDER — SIMVASTATIN 20 MG/1
20 TABLET, FILM COATED ORAL DAILY
Qty: 30 TABLET | Refills: 0 | Status: SHIPPED | OUTPATIENT
Start: 2025-07-15

## 2025-07-18 PROBLEM — Z00.00 HEALTHCARE MAINTENANCE: Status: ACTIVE | Noted: 2025-07-18

## 2025-07-18 NOTE — ASSESSMENT & PLAN NOTE
Lab Results   Component Value Date    CHOL 165 05/13/2024    TRIG 230 (H) 05/13/2024    HDL 36.6 05/13/2024    CHHDL 4.5 05/13/2024    LDLF 94 11/03/2022    VLDL 46 (H) 05/13/2024    NHDL 128 05/13/2024      - Recheck lipid panel  - Continue simvastatin  - Low fat/cholesterol diet and regular activity encouraged   - Goal LDL < 70     No

## 2025-07-18 NOTE — PROGRESS NOTES
"Patient ID:   Natty Lord is a 44 y.o. female with PMH remarkable for HTN, obesity, Sleep apnea, disorder of diaphragm, HLD, Depression with anxiety, and multinodular goiter who presents to the office today for No chief complaint on file..    HEALTH MAINTENANCE: FOLLOW UP   Last Office Visit: 5/13/24 with Gaye Fisher PA-C   Mammogram (40-75): 4/16/25 negative   Pap smear (21-65, or hysterectomy q5yrs): Negative Pap/HPV 10/2022, due for repeat after 10/2025- follows with GYN   Last Labs: May 2024, due   Colonoscopy (45-75 or age 40 with 1st degree relative dx colon ca): 2021 by Dr. Winchester due to family history of colon cancer; recommend repeat in 2026     HPI:    Natty presents to the office today     Social History[1]  Review of Systems  Visit Vitals  OB Status Having periods   Smoking Status Former     Physical Exam  Current Outpatient Medications   Medication Instructions    albuterol 90 mcg/actuation inhaler 2 puffs, inhalation    BD Integra Syringe 3 mL 25 gauge x 1\" syringe TO USE TO INJECT VITAMIN B12 ONCE AT HOME    citalopram (CeleXA) 20 mg tablet Take one tablet by mouth once daily ** needs to contact office for appt for further refills    cyanocobalamin (Vitamin B-12) 1,000 mcg/mL injection TO INJECT 1 ML ONCE WEEKLY FOR 3 WEEKS AND THEN ONCE MONTHLY FOR 11 MONTHS    L norgest/e.estradioL-e.estrad (Seasonique) 0.15 mg-30 mcg (84)/10 mcg (7) tablets,dose pack,3 month tablet 1 tablet, oral, Daily    losartan-hydrochlorothiazide (Hyzaar) 100-12.5 mg tablet 1 tablet, oral, Daily    metoprolol succinate XL (TOPROL-XL) 100 mg, oral, Daily, Do not crush or chew.    multivitamin tablet 1 tablet, oral, Daily    mv-min-folic acid-lutein 200-137.5 mcg tablet,chewable 2 tablets, oral, Daily    simvastatin (ZOCOR) 20 mg, oral, Daily, APPT NEEDED FOR FURTHER REFILLS    syringe with needle (Syringe 3cc/25Gx1\") 3 mL 25 gauge x 1\" syringe To use to inject vitamin b12 at home once weekly for 3 weeks and then once " monthly for 11 months      Lab Results   Component Value Date    WBC 6.4 2024    HGB 13.0 2024    HCT 41.4 2024     2024    CHOL 165 2024    TRIG 230 (H) 2024    HDL 36.6 2024    ALT 16 2024    AST 14 2024     2024    K 4.4 2024     2024    CREATININE 0.71 2024    BUN 11 2024    CO2 29 2024    TSH 1.36 2024    HGBA1C 5.1 2024       Problem List Items Addressed This Visit           ICD-10-CM       Cardiac and Vasculature    Hypercholesterolemia E78.00    Lab Results   Component Value Date    CHOL 165 2024    TRIG 230 (H) 2024    HDL 36.6 2024    CHHDL 4.5 2024    LDLF 94 2022    VLDL 46 (H) 2024    NHDL 128 2024      - Recheck lipid panel  - Continue simvastatin  - Low fat/cholesterol diet and regular activity encouraged   - Goal LDL < 70           Essential (primary) hypertension I10    - BP today  - Continue losartan-hydrochlorothiazide, metoprolol succinate  - Regular exercise and blood pressure monitoring is encouraged              Endocrine/Metabolic    Morbid obesity (Multi) E66.01    - Balanced diet, regular physical activity discussed              Health Encounters    Healthcare maintenance - Primary Z00.00    - Labs ordered today             Mental Health    Generalized anxiety disorder F41.1    - Continue citalopram              --------------------       [1]   Social History  Tobacco Use    Smoking status: Former     Current packs/day: 0.00     Average packs/day: 0.3 packs/day for 9.0 years (2.3 ttl pk-yrs)     Types: Cigarettes     Start date:      Quit date: 2008     Years since quittin.5     Passive exposure: Past    Smokeless tobacco: Never   Vaping Use    Vaping status: Never Used   Substance Use Topics    Alcohol use: Never    Drug use: Not Currently      "multivitamin tablet 1 tablet, Daily    mv-min-folic acid-lutein 200-137.5 mcg tablet,chewable 2 tablets, Daily    simvastatin (ZOCOR) 20 mg, oral, Daily, APPT NEEDED FOR FURTHER REFILLS    syringe with needle (Syringe 3cc/25Gx1\") 3 mL 25 gauge x 1\" syringe To use to inject vitamin b12 at home once weekly for 3 weeks and then once monthly for 11 months      Lab Results   Component Value Date    WBC 6.4 05/13/2024    HGB 13.0 05/13/2024    HCT 41.4 05/13/2024     05/13/2024    CHOL 165 05/13/2024    TRIG 230 (H) 05/13/2024    HDL 36.6 05/13/2024    ALT 16 05/13/2024    AST 14 05/13/2024     05/13/2024    K 4.4 05/13/2024     05/13/2024    CREATININE 0.71 05/13/2024    BUN 11 05/13/2024    CO2 29 05/13/2024    TSH 1.36 05/13/2024    HGBA1C 5.1 05/13/2024       Problem List Items Addressed This Visit           ICD-10-CM       Cardiac and Vasculature    Hypercholesterolemia E78.00    Lab Results   Component Value Date    CHOL 165 05/13/2024    TRIG 230 (H) 05/13/2024    HDL 36.6 05/13/2024    CHHDL 4.5 05/13/2024    LDLF 94 11/03/2022    VLDL 46 (H) 05/13/2024    NHDL 128 05/13/2024      - Recheck lipid panel  - Continue simvastatin  - Low fat/cholesterol diet and regular activity encouraged   - Goal LDL < 70           Essential (primary) hypertension I10    - BP today 116/80   - Continue losartan-hydrochlorothiazide, metoprolol succinate  - Regular exercise and blood pressure monitoring is encouraged              Endocrine/Metabolic    Morbid obesity (Multi) E66.01    - Down 48 lbs since 4/16/25  - Continue with balanced diet, regular physical activity   - Pt declines nutrition referral today             B12 deficiency E53.8    - On monthly cyanocobalamin injections; will check B12 level with labs              Health Encounters    Healthcare maintenance - Primary Z00.00    - Labs ordered today          Relevant Orders    Vitamin D 25-Hydroxy,Total (for eval of Vitamin D levels)    Lipid Panel    " Comprehensive Metabolic Panel    CBC and Auto Differential    Hemoglobin A1c    TSH    T4, free    Vitamin B12       Mental Health    Generalized anxiety disorder F41.1    - Continue citalopram              --------------------         [1]   Social History  Tobacco Use    Smoking status: Former     Current packs/day: 0.00     Average packs/day: 0.3 packs/day for 9.0 years (2.3 ttl pk-yrs)     Types: Cigarettes     Start date: 1999     Quit date:      Years since quittin.5     Passive exposure: Past    Smokeless tobacco: Never   Vaping Use    Vaping status: Never Used   Substance Use Topics    Alcohol use: Not Currently     Comment: A couple a month, but it's been awhile    Drug use: Not Currently

## 2025-07-18 NOTE — ASSESSMENT & PLAN NOTE
- BP today  - Continue losartan-hydrochlorothiazide, metoprolol succinate  - Regular exercise and blood pressure monitoring is encouraged

## 2025-07-21 ENCOUNTER — APPOINTMENT (OUTPATIENT)
Dept: PRIMARY CARE | Facility: CLINIC | Age: 45
End: 2025-07-21
Payer: COMMERCIAL

## 2025-07-21 VITALS
RESPIRATION RATE: 18 BRPM | BODY MASS INDEX: 43.19 KG/M2 | SYSTOLIC BLOOD PRESSURE: 116 MMHG | HEART RATE: 83 BPM | HEIGHT: 68 IN | WEIGHT: 285 LBS | OXYGEN SATURATION: 97 % | DIASTOLIC BLOOD PRESSURE: 80 MMHG

## 2025-07-21 DIAGNOSIS — E78.00 HYPERCHOLESTEROLEMIA: ICD-10-CM

## 2025-07-21 DIAGNOSIS — E66.01 MORBID OBESITY (MULTI): ICD-10-CM

## 2025-07-21 DIAGNOSIS — Z00.00 HEALTHCARE MAINTENANCE: Primary | ICD-10-CM

## 2025-07-21 DIAGNOSIS — E53.8 B12 DEFICIENCY: ICD-10-CM

## 2025-07-21 DIAGNOSIS — F41.1 GENERALIZED ANXIETY DISORDER: ICD-10-CM

## 2025-07-21 DIAGNOSIS — I10 ESSENTIAL (PRIMARY) HYPERTENSION: ICD-10-CM

## 2025-07-21 PROCEDURE — 3008F BODY MASS INDEX DOCD: CPT

## 2025-07-21 PROCEDURE — 99214 OFFICE O/P EST MOD 30 MIN: CPT

## 2025-07-21 PROCEDURE — 3074F SYST BP LT 130 MM HG: CPT

## 2025-07-21 PROCEDURE — 3079F DIAST BP 80-89 MM HG: CPT

## 2025-07-21 ASSESSMENT — PATIENT HEALTH QUESTIONNAIRE - PHQ9
2. FEELING DOWN, DEPRESSED OR HOPELESS: NOT AT ALL
SUM OF ALL RESPONSES TO PHQ9 QUESTIONS 1 AND 2: 0
1. LITTLE INTEREST OR PLEASURE IN DOING THINGS: NOT AT ALL

## 2025-07-21 ASSESSMENT — ENCOUNTER SYMPTOMS
FATIGUE: 1
CHILLS: 0
FREQUENCY: 0
VOMITING: 0
FEVER: 0
DIARRHEA: 0
JOINT SWELLING: 0
DYSURIA: 0
SHORTNESS OF BREATH: 0
ABDOMINAL PAIN: 0
LIGHT-HEADEDNESS: 0
DIZZINESS: 0
APPETITE CHANGE: 0
COUGH: 0
DIFFICULTY URINATING: 0
CONSTIPATION: 0
ACTIVITY CHANGE: 0
NAUSEA: 0

## 2025-07-21 ASSESSMENT — PAIN SCALES - GENERAL: PAINLEVEL_OUTOF10: 0-NO PAIN

## 2025-07-22 LAB
25(OH)D3+25(OH)D2 SERPL-MCNC: 49 NG/ML (ref 30–100)
ALBUMIN SERPL-MCNC: 4.1 G/DL (ref 3.6–5.1)
ALP SERPL-CCNC: 78 U/L (ref 31–125)
ALT SERPL-CCNC: 45 U/L (ref 6–29)
ANION GAP SERPL CALCULATED.4IONS-SCNC: 9 MMOL/L (CALC) (ref 7–17)
AST SERPL-CCNC: 25 U/L (ref 10–30)
BASOPHILS # BLD AUTO: 30 CELLS/UL (ref 0–200)
BASOPHILS NFR BLD AUTO: 0.5 %
BILIRUB SERPL-MCNC: 0.7 MG/DL (ref 0.2–1.2)
BUN SERPL-MCNC: 16 MG/DL (ref 7–25)
CALCIUM SERPL-MCNC: 8.8 MG/DL (ref 8.6–10.2)
CHLORIDE SERPL-SCNC: 105 MMOL/L (ref 98–110)
CHOLEST SERPL-MCNC: 144 MG/DL
CHOLEST/HDLC SERPL: 4.1 (CALC)
CO2 SERPL-SCNC: 24 MMOL/L (ref 20–32)
CREAT SERPL-MCNC: 0.84 MG/DL (ref 0.5–0.99)
EGFRCR SERPLBLD CKD-EPI 2021: 88 ML/MIN/1.73M2
EOSINOPHIL # BLD AUTO: 112 CELLS/UL (ref 15–500)
EOSINOPHIL NFR BLD AUTO: 1.9 %
ERYTHROCYTE [DISTWIDTH] IN BLOOD BY AUTOMATED COUNT: 14.8 % (ref 11–15)
EST. AVERAGE GLUCOSE BLD GHB EST-MCNC: 100 MG/DL
EST. AVERAGE GLUCOSE BLD GHB EST-SCNC: 5.5 MMOL/L
GLUCOSE SERPL-MCNC: 80 MG/DL (ref 65–99)
HBA1C MFR BLD: 5.1 %
HCT VFR BLD AUTO: 42.1 % (ref 35–45)
HDLC SERPL-MCNC: 35 MG/DL
HGB BLD-MCNC: 12.7 G/DL (ref 11.7–15.5)
LDLC SERPL CALC-MCNC: 86 MG/DL (CALC)
LYMPHOCYTES # BLD AUTO: 1156 CELLS/UL (ref 850–3900)
LYMPHOCYTES NFR BLD AUTO: 19.6 %
MCH RBC QN AUTO: 30.7 PG (ref 27–33)
MCHC RBC AUTO-ENTMCNC: 30.2 G/DL (ref 32–36)
MCV RBC AUTO: 101.7 FL (ref 80–100)
MONOCYTES # BLD AUTO: 478 CELLS/UL (ref 200–950)
MONOCYTES NFR BLD AUTO: 8.1 %
NEUTROPHILS # BLD AUTO: 4124 CELLS/UL (ref 1500–7800)
NEUTROPHILS NFR BLD AUTO: 69.9 %
NONHDLC SERPL-MCNC: 109 MG/DL (CALC)
PLATELET # BLD AUTO: 284 THOUSAND/UL (ref 140–400)
PMV BLD REES-ECKER: 10.3 FL (ref 7.5–12.5)
POTASSIUM SERPL-SCNC: 3.7 MMOL/L (ref 3.5–5.3)
PROT SERPL-MCNC: 6.8 G/DL (ref 6.1–8.1)
RBC # BLD AUTO: 4.14 MILLION/UL (ref 3.8–5.1)
SODIUM SERPL-SCNC: 138 MMOL/L (ref 135–146)
T4 FREE SERPL-MCNC: 1.1 NG/DL (ref 0.8–1.8)
TRIGL SERPL-MCNC: 133 MG/DL
TSH SERPL-ACNC: 1.71 MIU/L
VIT B12 SERPL-MCNC: 431 PG/ML (ref 200–1100)
WBC # BLD AUTO: 5.9 THOUSAND/UL (ref 3.8–10.8)

## 2025-07-25 DIAGNOSIS — I10 ESSENTIAL (PRIMARY) HYPERTENSION: ICD-10-CM

## 2025-07-25 RX ORDER — LOSARTAN POTASSIUM AND HYDROCHLOROTHIAZIDE 12.5; 1 MG/1; MG/1
1 TABLET ORAL DAILY
Qty: 90 TABLET | Refills: 1 | Status: SHIPPED | OUTPATIENT
Start: 2025-07-25

## 2025-08-07 DIAGNOSIS — I10 HYPERTENSION, UNSPECIFIED TYPE: ICD-10-CM

## 2025-08-07 RX ORDER — SIMVASTATIN 20 MG/1
20 TABLET, FILM COATED ORAL DAILY
Qty: 90 TABLET | Refills: 1 | Status: SHIPPED | OUTPATIENT
Start: 2025-08-07